# Patient Record
Sex: FEMALE | Race: WHITE | Employment: FULL TIME | URBAN - METROPOLITAN AREA
[De-identification: names, ages, dates, MRNs, and addresses within clinical notes are randomized per-mention and may not be internally consistent; named-entity substitution may affect disease eponyms.]

---

## 2024-10-17 ENCOUNTER — APPOINTMENT (OUTPATIENT)
Dept: OBSTETRICS AND GYNECOLOGY | Facility: CLINIC | Age: 39
End: 2024-10-17
Payer: COMMERCIAL

## 2024-10-17 VITALS
SYSTOLIC BLOOD PRESSURE: 118 MMHG | WEIGHT: 150 LBS | DIASTOLIC BLOOD PRESSURE: 72 MMHG | BODY MASS INDEX: 22.73 KG/M2 | HEIGHT: 68 IN

## 2024-10-17 DIAGNOSIS — R10.2 PELVIC PAIN IN FEMALE: Primary | ICD-10-CM

## 2024-10-17 DIAGNOSIS — Z87.59 HISTORY OF ECTOPIC PREGNANCY: ICD-10-CM

## 2024-10-17 PROCEDURE — 99203 OFFICE O/P NEW LOW 30 MIN: CPT | Performed by: OBSTETRICS & GYNECOLOGY

## 2024-10-17 PROCEDURE — 3008F BODY MASS INDEX DOCD: CPT | Performed by: OBSTETRICS & GYNECOLOGY

## 2024-10-17 PROCEDURE — 1036F TOBACCO NON-USER: CPT | Performed by: OBSTETRICS & GYNECOLOGY

## 2024-10-17 ASSESSMENT — PAIN SCALES - GENERAL: PAINLEVEL_OUTOF10: 0-NO PAIN

## 2024-10-17 NOTE — PROGRESS NOTES
"Assessment     PLAN  1. Pelvic pain in female (Primary)  - Recommend pelvic ultrasound to assess for structural abnormalities. Suspect musculoskeletal pain based on description. Recommend trial of pelvic floor PT and she is agreeable.   - US PELVIS TRANSABDOMINAL WITH TRANSVAGINAL; Future  - Referral to Physical Therapy; Future    Please return for your next preventative visit or sooner NATALIE Galaviz MD      Subjective     Rosalina Castillo is a 39 y.o. female who is here for a problem visit  PCP = No primary care provider on file.    Concerns: persistent pelvic  and umbilical pain after ectopic surgery in 2024  - trying to conceive since   - lap left salpingectomy at [facility] in 2024  - recovered well initially but then feels like she is having persistent discomfort at her umbilicus.   - lower abdomen hurts with BM  - has had painful sex since surgery  - worsening pain with menses since surgery  - decreased sensation with voiding, frequent urination    Gynecologic History:    OBHx:   Menses: Regular last few months,  veyr painful   Last Pap: reports up to date  Sexually active: active with   Contraception: None      PMH, PSH, FH, SH, medications and allergies reviewed and edited as needed.      Objective   /72   Ht 1.727 m (5' 8\")   Wt 68 kg (150 lb)   LMP 2024   BMI 22.81 kg/m²      General:   Alert and oriented, in no acute distress           Abdomen: Soft, non-tender, without masses or organomegaly   Vulva: Normal architecture without erythema, masses, or lesions.    Vagina: Normal mucosa without lesions, masses, or atrophy. No abnormal vaginal discharge.    Cervix: Normal without masses, lesions, or signs of cervicitis.    Uterus: Normal mobile, non-enlarged uterus    Adnexa: Normal without masses or lesions   Pelvic Floor No POP noted. No high tone pelvic floor   Psych Normal affect. Normal mood.        " Yes

## 2024-10-19 ENCOUNTER — HOSPITAL ENCOUNTER (OUTPATIENT)
Dept: RADIOLOGY | Facility: CLINIC | Age: 39
Discharge: HOME | End: 2024-10-19
Payer: COMMERCIAL

## 2024-10-19 DIAGNOSIS — R10.2 PELVIC PAIN IN FEMALE: ICD-10-CM

## 2024-10-19 PROCEDURE — 76856 US EXAM PELVIC COMPLETE: CPT

## 2024-10-19 PROCEDURE — 76856 US EXAM PELVIC COMPLETE: CPT | Performed by: STUDENT IN AN ORGANIZED HEALTH CARE EDUCATION/TRAINING PROGRAM

## 2024-10-19 PROCEDURE — 76830 TRANSVAGINAL US NON-OB: CPT | Performed by: STUDENT IN AN ORGANIZED HEALTH CARE EDUCATION/TRAINING PROGRAM

## 2024-11-15 ENCOUNTER — APPOINTMENT (OUTPATIENT)
Dept: OBSTETRICS AND GYNECOLOGY | Facility: CLINIC | Age: 39
End: 2024-11-15
Payer: COMMERCIAL

## 2024-11-15 ENCOUNTER — APPOINTMENT (OUTPATIENT)
Dept: PHYSICAL THERAPY | Facility: CLINIC | Age: 39
End: 2024-11-15
Payer: COMMERCIAL

## 2024-11-21 ENCOUNTER — APPOINTMENT (OUTPATIENT)
Dept: PHYSICAL THERAPY | Facility: CLINIC | Age: 39
End: 2024-11-21
Payer: COMMERCIAL

## 2024-11-29 ENCOUNTER — APPOINTMENT (OUTPATIENT)
Dept: PHYSICAL THERAPY | Facility: CLINIC | Age: 39
End: 2024-11-29
Payer: COMMERCIAL

## 2025-01-22 ENCOUNTER — APPOINTMENT (OUTPATIENT)
Dept: PRIMARY CARE | Facility: CLINIC | Age: 40
End: 2025-01-22
Payer: COMMERCIAL

## 2025-01-22 ENCOUNTER — LAB (OUTPATIENT)
Dept: LAB | Facility: LAB | Age: 40
End: 2025-01-22
Payer: COMMERCIAL

## 2025-01-22 VITALS
HEART RATE: 76 BPM | SYSTOLIC BLOOD PRESSURE: 127 MMHG | HEIGHT: 68 IN | TEMPERATURE: 98.6 F | BODY MASS INDEX: 23.49 KG/M2 | RESPIRATION RATE: 18 BRPM | WEIGHT: 155 LBS | DIASTOLIC BLOOD PRESSURE: 83 MMHG

## 2025-01-22 DIAGNOSIS — R53.83 FATIGUE, UNSPECIFIED TYPE: ICD-10-CM

## 2025-01-22 DIAGNOSIS — Z13.9 SCREENING DUE: ICD-10-CM

## 2025-01-22 DIAGNOSIS — Z31.9 INFERTILITY MANAGEMENT: ICD-10-CM

## 2025-01-22 DIAGNOSIS — Z00.00 LABORATORY EXAM ORDERED AS PART OF ROUTINE GENERAL MEDICAL EXAMINATION: ICD-10-CM

## 2025-01-22 DIAGNOSIS — Z87.11 HISTORY OF STOMACH ULCERS: ICD-10-CM

## 2025-01-22 DIAGNOSIS — Z00.00 ROUTINE GENERAL MEDICAL EXAMINATION AT A HEALTH CARE FACILITY: Primary | ICD-10-CM

## 2025-01-22 DIAGNOSIS — O00.102 LEFT TUBAL PREGNANCY WITHOUT INTRAUTERINE PREGNANCY (HHS-HCC): ICD-10-CM

## 2025-01-22 LAB
ERYTHROCYTE [DISTWIDTH] IN BLOOD BY AUTOMATED COUNT: 12.6 % (ref 11.5–14.5)
ERYTHROCYTE [SEDIMENTATION RATE] IN BLOOD BY WESTERGREN METHOD: 11 MM/H (ref 0–20)
EST. AVERAGE GLUCOSE BLD GHB EST-MCNC: 85 MG/DL
HBA1C MFR BLD: 4.6 %
HCT VFR BLD AUTO: 42.8 % (ref 36–46)
HGB BLD-MCNC: 14.6 G/DL (ref 12–16)
HIV 1+2 AB+HIV1 P24 AG SERPL QL IA: NONREACTIVE
MCH RBC QN AUTO: 31.9 PG (ref 26–34)
MCHC RBC AUTO-ENTMCNC: 34.1 G/DL (ref 32–36)
MCV RBC AUTO: 93 FL (ref 80–100)
NRBC BLD-RTO: 0 /100 WBCS (ref 0–0)
PLATELET # BLD AUTO: 334 X10*3/UL (ref 150–450)
RBC # BLD AUTO: 4.58 X10*6/UL (ref 4–5.2)
WBC # BLD AUTO: 5.5 X10*3/UL (ref 4.4–11.3)

## 2025-01-22 PROCEDURE — 86038 ANTINUCLEAR ANTIBODIES: CPT

## 2025-01-22 PROCEDURE — 82607 VITAMIN B-12: CPT

## 2025-01-22 PROCEDURE — 83036 HEMOGLOBIN GLYCOSYLATED A1C: CPT

## 2025-01-22 PROCEDURE — 86803 HEPATITIS C AB TEST: CPT

## 2025-01-22 PROCEDURE — 86146 BETA-2 GLYCOPROTEIN ANTIBODY: CPT

## 2025-01-22 PROCEDURE — 3008F BODY MASS INDEX DOCD: CPT | Performed by: NURSE PRACTITIONER

## 2025-01-22 PROCEDURE — 85027 COMPLETE CBC AUTOMATED: CPT

## 2025-01-22 PROCEDURE — 86140 C-REACTIVE PROTEIN: CPT

## 2025-01-22 PROCEDURE — 85730 THROMBOPLASTIN TIME PARTIAL: CPT

## 2025-01-22 PROCEDURE — 85652 RBC SED RATE AUTOMATED: CPT

## 2025-01-22 PROCEDURE — 84443 ASSAY THYROID STIM HORMONE: CPT

## 2025-01-22 PROCEDURE — 99385 PREV VISIT NEW AGE 18-39: CPT | Performed by: NURSE PRACTITIONER

## 2025-01-22 PROCEDURE — 1036F TOBACCO NON-USER: CPT | Performed by: NURSE PRACTITIONER

## 2025-01-22 PROCEDURE — 80053 COMPREHEN METABOLIC PANEL: CPT

## 2025-01-22 PROCEDURE — 82306 VITAMIN D 25 HYDROXY: CPT

## 2025-01-22 PROCEDURE — 86147 CARDIOLIPIN ANTIBODY EA IG: CPT

## 2025-01-22 PROCEDURE — 86376 MICROSOMAL ANTIBODY EACH: CPT

## 2025-01-22 PROCEDURE — 85613 RUSSELL VIPER VENOM DILUTED: CPT

## 2025-01-22 PROCEDURE — 80061 LIPID PANEL: CPT

## 2025-01-22 PROCEDURE — 87389 HIV-1 AG W/HIV-1&-2 AB AG IA: CPT

## 2025-01-22 ASSESSMENT — ENCOUNTER SYMPTOMS
PALPITATIONS: 0
EYES NEGATIVE: 1
RESPIRATORY NEGATIVE: 1
HEADACHES: 1
PSYCHIATRIC NEGATIVE: 1
HEMATOLOGIC/LYMPHATIC NEGATIVE: 1
ALLERGIC/IMMUNOLOGIC NEGATIVE: 1
FATIGUE: 1
MUSCULOSKELETAL NEGATIVE: 1

## 2025-01-22 ASSESSMENT — ANXIETY QUESTIONNAIRES
6. BECOMING EASILY ANNOYED OR IRRITABLE: NOT AT ALL
GAD7 TOTAL SCORE: 0
7. FEELING AFRAID AS IF SOMETHING AWFUL MIGHT HAPPEN: NOT AT ALL
5. BEING SO RESTLESS THAT IT IS HARD TO SIT STILL: NOT AT ALL
IF YOU CHECKED OFF ANY PROBLEMS ON THIS QUESTIONNAIRE, HOW DIFFICULT HAVE THESE PROBLEMS MADE IT FOR YOU TO DO YOUR WORK, TAKE CARE OF THINGS AT HOME, OR GET ALONG WITH OTHER PEOPLE: NOT DIFFICULT AT ALL
3. WORRYING TOO MUCH ABOUT DIFFERENT THINGS: NOT AT ALL
4. TROUBLE RELAXING: NOT AT ALL
1. FEELING NERVOUS, ANXIOUS, OR ON EDGE: NOT AT ALL
2. NOT BEING ABLE TO STOP OR CONTROL WORRYING: NOT AT ALL

## 2025-01-22 ASSESSMENT — PATIENT HEALTH QUESTIONNAIRE - PHQ9
10. IF YOU CHECKED OFF ANY PROBLEMS, HOW DIFFICULT HAVE THESE PROBLEMS MADE IT FOR YOU TO DO YOUR WORK, TAKE CARE OF THINGS AT HOME, OR GET ALONG WITH OTHER PEOPLE: SOMEWHAT DIFFICULT
2. FEELING DOWN, DEPRESSED OR HOPELESS: SEVERAL DAYS
SUM OF ALL RESPONSES TO PHQ9 QUESTIONS 1 AND 2: 1
1. LITTLE INTEREST OR PLEASURE IN DOING THINGS: NOT AT ALL

## 2025-01-22 NOTE — PROGRESS NOTES
"Subjective   Patient ID: Rosalina Castillo is a 39 y.o. female who presents for Establish Care.    She is very active and working out is difficult.  Heart rate increases pretty easily.  Prior to it has been fine not feeling back to normal.  Eats healthy. In the winter has facial flushing.   In her family she found out her parents had trouble conceiving but had two natural pregnancies ultimately. Her dad has had some blood clots- she has never had blood clots.        Patient had ruptured ectopic pregnancy 06/2024.   She has followed with Dr. Lyons for GYN.     Review of Systems   Constitutional:  Positive for fatigue.   HENT: Negative.     Eyes: Negative.    Respiratory: Negative.     Cardiovascular:  Negative for chest pain, palpitations and leg swelling.   Endocrine: Positive for polyuria (every since surgery for ectopic).   Genitourinary: Negative.    Musculoskeletal: Negative.    Skin: Negative.    Allergic/Immunologic: Negative.    Neurological:  Positive for headaches (more often than not take tylenol).   Hematological: Negative.    Psychiatric/Behavioral: Negative.         Objective   /83   Pulse 76   Temp 37 °C (98.6 °F)   Resp 18   Ht 1.727 m (5' 8\")   Wt 70.3 kg (155 lb)   LMP 01/19/2025 (Approximate)   BMI 23.57 kg/m²     Physical Exam  Vitals and nursing note reviewed.   Constitutional:       Appearance: Normal appearance.   HENT:      Head: Normocephalic and atraumatic.      Nose: Nose normal.      Mouth/Throat:      Mouth: Mucous membranes are moist.   Eyes:      Pupils: Pupils are equal, round, and reactive to light.   Cardiovascular:      Rate and Rhythm: Normal rate and regular rhythm.      Pulses: Normal pulses.      Heart sounds: Normal heart sounds. No murmur heard.     No gallop.      Comments: Reddish discoloration of fingers decreased cap refill     Pulmonary:      Effort: Pulmonary effort is normal. No respiratory distress.      Breath sounds: Normal breath sounds. No stridor. No " wheezing, rhonchi or rales.   Chest:      Chest wall: No tenderness.   Abdominal:      General: Abdomen is flat.      Palpations: Abdomen is soft.   Musculoskeletal:         General: Normal range of motion.      Cervical back: Normal range of motion.   Skin:     General: Skin is warm.      Capillary Refill: Capillary refill takes 2 to 3 seconds.      Findings: Rash is not crusting, macular, nodular, papular, purpuric, pustular, scaling, urticarial or vesicular.      Nails: There is no clubbing.   Neurological:      General: No focal deficit present.      Mental Status: She is alert and oriented to person, place, and time.      Cranial Nerves: Cranial nerves 2-12 are intact.      Sensory: Sensation is intact.      Motor: Motor function is intact.      Deep Tendon Reflexes: Reflexes are normal and symmetric.   Psychiatric:         Mood and Affect: Mood normal.         Behavior: Behavior normal.         Thought Content: Thought content normal.         Judgment: Judgment normal.         Assessment/Plan   Problem List Items Addressed This Visit             ICD-10-CM    Left tubal pregnancy without intrauterine pregnancy ([facility]) O00.102    Relevant Orders    Referral to Reproductive Endocrinology    Infertility management Z31.9     Ref to infertility for poss IUI consideration.            Relevant Orders    Lupus Anticoagulant with Interpretation (DRVVT + SCT)    Anti-Cardiolipin Antibody (IgA, IgG, and IgM)    Beta-2 Glycoprotein Antibodies (IgA, IgG and IgM)    Referral to Reproductive Endocrinology    History of stomach ulcers Z87.11     Secondary to NSAID use   Avoid NSAIDS and if needed prophylaxis with PPI           Other Visit Diagnoses         Codes    Routine general medical examination at a health care facility    -  Primary Z00.00    Laboratory exam ordered as part of routine general medical examination     Z00.00    Relevant Orders    Hemoglobin A1C    CBC    Lipid Panel    Vitamin D 25-Hydroxy,Total (for  eval of Vitamin D levels)    Vitamin B12    Comprehensive Metabolic Panel    Fatigue, unspecified type     R53.83    Relevant Orders    ALISHA with Reflex to JUDI    C-Reactive Protein    Sedimentation Rate    Thyroid Peroxidase (TPO) Antibody    TSH with reflex to Free T4 if abnormal    Screening due     Z13.9    Relevant Orders    HIV 1/2 Antigen/Antibody Screen with Reflex to Confirmation    Hepatitis C antibody             Discussing IUI at this point.

## 2025-01-23 LAB
25(OH)D3 SERPL-MCNC: 27 NG/ML (ref 30–100)
ALBUMIN SERPL BCP-MCNC: 4.3 G/DL (ref 3.4–5)
ALP SERPL-CCNC: 44 U/L (ref 33–110)
ALT SERPL W P-5'-P-CCNC: 20 U/L (ref 7–45)
ANA SER QL HEP2 SUBST: NEGATIVE
ANION GAP SERPL CALC-SCNC: 13 MMOL/L (ref 10–20)
AST SERPL W P-5'-P-CCNC: 22 U/L (ref 9–39)
B2 GLYCOPROT1 IGA SER-ACNC: 0.7 U/ML
B2 GLYCOPROT1 IGG SER-ACNC: <1.4 U/ML
B2 GLYCOPROT1 IGM SER-ACNC: 3.9 U/ML
BILIRUB SERPL-MCNC: 0.5 MG/DL (ref 0–1.2)
BUN SERPL-MCNC: 13 MG/DL (ref 6–23)
CALCIUM SERPL-MCNC: 9.2 MG/DL (ref 8.6–10.6)
CARDIOLIPIN IGA SERPL-ACNC: 1.7 APL U/ML
CARDIOLIPIN IGG SER IA-ACNC: <1.6 GPL U/ML
CARDIOLIPIN IGM SER IA-ACNC: 6.4 MPL U/ML
CHLORIDE SERPL-SCNC: 102 MMOL/L (ref 98–107)
CHOLEST SERPL-MCNC: 210 MG/DL (ref 0–199)
CHOLESTEROL/HDL RATIO: 3
CO2 SERPL-SCNC: 27 MMOL/L (ref 21–32)
CREAT SERPL-MCNC: 0.73 MG/DL (ref 0.5–1.05)
CRP SERPL-MCNC: 0.29 MG/DL
DRVVT SCREEN TO CONFIRM RATIO: 0.79 RATIO
DRVVT/DRVVT CFM NRMLZD PPP-RTO: 0.9 RATIO
DRVVT/DRVVT CFM P DOAC NEUT NORM PPP-RTO: 0.87 RATIO
EGFRCR SERPLBLD CKD-EPI 2021: >90 ML/MIN/1.73M*2
GLUCOSE SERPL-MCNC: 99 MG/DL (ref 74–99)
HCV AB SER QL: NONREACTIVE
HDLC SERPL-MCNC: 69.3 MG/DL
LA 2 SCREEN W REFLEX-IMP: NORMAL
LDLC SERPL CALC-MCNC: 118 MG/DL
NON HDL CHOLESTEROL: 141 MG/DL (ref 0–149)
NORMALIZED SCT PPP-RTO: 0.8 RATIO
POTASSIUM SERPL-SCNC: 4.1 MMOL/L (ref 3.5–5.3)
PROT SERPL-MCNC: 7.2 G/DL (ref 6.4–8.2)
SILICA CLOTTING TIME CONFIRMATION: 0.89 RATIO
SILICA CLOTTING TIME SCREEN: 0.72 RATIO
SODIUM SERPL-SCNC: 138 MMOL/L (ref 136–145)
THYROPEROXIDASE AB SERPL-ACNC: <28 IU/ML
TRIGL SERPL-MCNC: 115 MG/DL (ref 0–149)
TSH SERPL-ACNC: 1.51 MIU/L (ref 0.44–3.98)
VIT B12 SERPL-MCNC: 367 PG/ML (ref 211–911)
VLDL: 23 MG/DL (ref 0–40)

## 2025-01-28 ASSESSMENT — LIFESTYLE VARIABLES
TOBACCO_USE: NO
HISTORY_ALCOHOL_USE: YES

## 2025-01-29 ENCOUNTER — CONSULT (OUTPATIENT)
Dept: ENDOCRINOLOGY | Facility: CLINIC | Age: 40
End: 2025-01-29
Payer: COMMERCIAL

## 2025-01-29 VITALS
SYSTOLIC BLOOD PRESSURE: 132 MMHG | HEIGHT: 68 IN | HEART RATE: 69 BPM | DIASTOLIC BLOOD PRESSURE: 87 MMHG | WEIGHT: 155 LBS | BODY MASS INDEX: 23.49 KG/M2

## 2025-01-29 DIAGNOSIS — N91.5 OLIGOMENORRHEA, UNSPECIFIED TYPE: ICD-10-CM

## 2025-01-29 DIAGNOSIS — Z31.41 FERTILITY TESTING: ICD-10-CM

## 2025-01-29 DIAGNOSIS — Z01.812 ENCOUNTER FOR PREPROCEDURAL LABORATORY EXAMINATION: ICD-10-CM

## 2025-01-29 DIAGNOSIS — Z11.59 ENCOUNTER FOR SCREENING FOR OTHER VIRAL DISEASES: ICD-10-CM

## 2025-01-29 DIAGNOSIS — N70.11 CHRONIC SALPINGITIS: Primary | ICD-10-CM

## 2025-01-29 DIAGNOSIS — Z13.71 SCREENING FOR GENETIC DISEASE CARRIER STATUS: ICD-10-CM

## 2025-01-29 DIAGNOSIS — O00.102 LEFT TUBAL PREGNANCY WITHOUT INTRAUTERINE PREGNANCY (HHS-HCC): ICD-10-CM

## 2025-01-29 DIAGNOSIS — Z01.83 ENCOUNTER FOR RH BLOOD TYPING: ICD-10-CM

## 2025-01-29 DIAGNOSIS — Z11.3 SCREENING FOR STDS (SEXUALLY TRANSMITTED DISEASES): ICD-10-CM

## 2025-01-29 DIAGNOSIS — Z31.9 INFERTILITY MANAGEMENT: ICD-10-CM

## 2025-01-29 PROCEDURE — 99215 OFFICE O/P EST HI 40 MIN: CPT | Performed by: OBSTETRICS & GYNECOLOGY

## 2025-01-29 ASSESSMENT — PAIN SCALES - GENERAL: PAINLEVEL_OUTOF10: 3

## 2025-01-29 NOTE — PROGRESS NOTES
Visit Type: In Person  MD reviewed, Authorization obtained to share with partner.    NEW FERTILITY PATIENT VISIT    Referred by: My PCP    Accompanied today by: Jonas Castillo       Rosalina Castillo is a 39 y.o.  female who presents with primary infertility and desires to proceed with testing.  She and her partner have been attempting to become pregnant over the course of the last 3 and half years without success.  He had previously been seen by Dr. Dimas at [facility] and had undergone initial workup in summer 2023.  Following that they had a spontaneous pregnancy that occurred in the spring 2024 that resulted in a left-sided ruptured ectopic pregnancy.  This was treated by laparoscopic left salpingectomy in 2024 at [facility].  Since that time the patient does report having increased pelvic pain that occurs with menses.  Menses are also heavier and last longer than they previously had, now lasting 6 to 8 days rather 3 to 4 days.    She recently had blood testing performed with her PCP as seen below and APLS labs were obtained as her father does have a history of blood clots.  The patient has no personal history of thromboembolism.    Have you had any concerns about your fertility treatments so far?     What are you goals for today's visit? To determine a realistic plan to get pregnant, why I have not been able to conceive, pain relief     What causes of infertility have been identified on your workup so far? Unexplained.     Past Infertility Treatments: No      Please summarize your fertility treatments to date.       As far as you are aware, do you have insurance coverage for fertility diagnostic testing and/or fertility treatments? No      PRIOR EVALUATION / TREATMENT    GYN Pelvic Ultrasound: US PELVIS (7/3/2024):   1. No acute pelvic pathology. Small volume pelvic free fluid likely physiologic.  2. Right corpus luteal cyst measuring 1.7 cm.        Prior Labs  Component      Latest Ref Rng 2025    DRVVT Screen      RATIO 0.79    DRVVT Confirmation      RATIO 0.90    DRVVT Test Ratio      <=1.20 RATIO 0.87    SCT Screen      RATIO 0.72    SCT Confirmation      RATIO 0.89    SCT Test Ratio      <=1.16 RATIO 0.80    Lupus Anticoagulant Interpretation No evidence of lupus anticoagulant in these assays (DRVVT and Silica Clotting Time (SCT)). Assay interferences may occur in the presence of factor deficiency/inhibitor and/or anticoagulants. For patients on anti-Vitamin K therapy, repeating DRVVT testing might be indicated when the patient is off anti-vitamin K therapy. The DRVVT assay contains a heparin neutralizer up to 1.0 U/mL. Higher concentrations of heparin may cause interferences. SCT results are not affected by UF heparin up to 0.5 U/mL and LMW Heparin up to 1.0 U/mL. Higher concentrations of heparin may cause interferences. Correlation with clinical findings and clinical history is necessary to assess significance of results in an individual patient.    Anticardiolipin IgA      <20.0 APL U/mL 1.7    Anticardiolipin IgG      <20.0 GPL U/mL <1.6    Anticardiolipin IgM      <20.0 MPL U/mL 6.4    Beta-2 Glyco 1 IgG      <20.0 U/mL <1.4    Beta-2 Glyco 1 IgA      <20.0 U/mL 0.7    Beta 2 Glyco 1 IgM      <20.0 U/mL 3.9    Hemoglobin A1C      See comment % 4.6    Estimated Average Glucose      Not Established mg/dL 85    ALISHA      Negative  Negative    C-Reactive Protein      <1.00 mg/dL 0.29    Sed Rate      0 - 20 mm/h 11    Thyroid Peroxidase (TPO) Antibody      <=60 IU/mL <28    Thyroid Stimulating Hormone      0.44 - 3.98 mIU/L 1.51    Vitamin D, 25-Hydroxy, Total      30 - 100 ng/mL 27 (L)    Vitamin B12      211 - 911 pg/mL 367    HIV 1/2 Antigen/Antibody Screen with Reflex to Confirmation      Nonreactive  Nonreactive    Hepatitis C AB      Nonreactive  Nonreactive         Relationship Status:      Have you ever been pregnant? Yes    How many times have you been pregnant? 1    Have you ever had a  "miscarriage? No    How many times have you had a miscarriage?       OB Hx     OB History          1    Para        Term                AB   1    Living             SAB        IAB        Ectopic   1    Multiple        Live Births                     GYN HISTORY   Have you ever been diagnosed with a sexually transmitted disease? Yes    Please select all that are applicable: herpes    Have you ever had Pelvic Inflammatory Disease? No    Have you had an abnormal PAP smear? Yes    Date & Result of last PAP smear: No results found for: \"FINALINTERP\"   Have you ever had an abnormal Mammogram? No    Date & result of your last mammogram:    Do you have pelvic pain? Yes    How many times per week do you have intercourse? 2-3    Do you have pain with intercourse? Yes    Do you use lubricants with intercourse?    Do you have pain with bowel movements? Yes              Do you have pain with a full bladder? Yes    MENSTRUAL HISTORY  LMP:    Menarche:    Contraception:    Cycle length: 29    Describe your bleeding: Heavy    Dysmenorrhea: Yes       ENDOCRINE/INFERTILITY HISTORY  Duration of infertility: 1-5 years    Coital Activity/week: 2-3    Nipple Discharge: No    Vision changes: Yes    Headaches: Yes    Excess hair growth: No    Excessive hair loss: No    Acne: No    Oily skin: No    Recent weight change  Weight gain: Yes    Weight loss: No    Exercise more than 3 times a week: No      PMH  Past Medical History:   Diagnosis Date    Abnormal Pap smear of cervix     Herpes         MEDICATIONS  Current Outpatient Medications on File Prior to Visit   Medication Sig Dispense Refill    PRENATAL 2-IRON-FOLIC ACID-OM3 ORAL Take by mouth.       No current facility-administered medications on file prior to visit.        PSH  Past Surgical History:   Procedure Laterality Date    ECTOPIC PREGNANCY SURGERY Left         PSYCH HISTORY  Have you ever been diagnosed with a mental health Issue? No    Have you ever been " "hospitalized for a mental health disorder? No       SOCIAL HISTORY  Social History     Tobacco Use    Smoking status: Never     Passive exposure: Never    Smokeless tobacco: Never   Vaping Use    Vaping status: Never Used   Substance Use Topics    Alcohol use: Yes     Alcohol/week: 5.0 standard drinks of alcohol     Types: 3 Glasses of wine, 2 Standard drinks or equivalent per week    Drug use: Never     Occupation: Teacher    Have you ever been incarcerated? No    Do you have a history of domestic violence? No    Do you feel safe at home? Yes    Do you have a history of any negative sexual experience such as incest or rape? No       PARTNER HISTORY  Partner Name: Jonas Castillo    Partner : [id number]    Partner email:    Occupation: Teacher    Prior fertility history:    PMH:    PSH:    Smoking:No    Alcohol Use: Yes    Drug Use: No    Medications:    Injuries: No    STD: Yes    Please select all that are applicable: herpes    SA: Yes    SA Results: Yes      FAMILY HISTORY  Family History   Problem Relation Name Age of Onset    Other (overweight) Father Michaal     Lung cancer Maternal Grandmother      Other (old age) Maternal Grandfather         CANCER HISTORY    Breast:    Ovarian:    Colon:    Endometrial:      FAMILY VTE HISTORY  Family History of Blood Clots:      GENETIC HISTORY  Ethnic Background  Patient:     Partner:     Genetic Disease in Family  Patient: No    Partner: No    Birth Defects in Family  Patient: No    Partner: No    Genetic screening performed previously:       BMI:   BMI Readings from Last 1 Encounters:   25 23.57 kg/m²     VITALS:  /87 (BP Location: Right arm, Patient Position: Sitting, BP Cuff Size: Adult)   Pulse 69   Ht 1.727 m (5' 8\")   Wt 70.3 kg (155 lb)   LMP 2025 (Exact Date)   BMI 23.57 kg/m²     ASSESSMENT   Rosalina is a 39 y.o.  female with primary infertility, pelvic pain, dysmenorrhea, status post left salpingectomy for " ruptured ectopic pregnancy in June 2024.  She desires to proceed with fertility testing.    Partner SA:  Ordered    COUNSELING  We discussed causes of infertility including hormonal, egg quality issues, structural problems such as endometriosis, adhesions, or tubal problems, uterine factors such as polyps or fibroids, and sperm issues. Reviewed evaluation of such as well. We discussed various methods for achieving pregnancy in some detail including, ovulation induction, insemination, superovulation and IVF.    Routine Testing  Fertility Center  STDs Within 1 year   Genetic carrier Waiver/Completed   T&S Within 1 year   AMH Within 1 year   TSH Within 1 year   Rubella/Varicella Within 5 years     PLAN  Orders Placed This Encounter   Procedures    Hysterosalpingogram (HSG)    FL hysterosalpingogram    Antimullerian Hormone (Amh)    TSH with reflex to Free T4 if abnormal    Prolactin    Type And Screen Is this order related to pregnancy or an upcoming surgery? No    Rubella Antibody, Igg    Varicella Zoster Antibody, Igg    Hepatitis B surface antigen    Syphilis Screen with Reflex    C. trachomatis / N. gonorrhoeae, Amplified, Urogenital    Myriad Foresight Carrier Screen    POCT pregnancy, urine manually resulted       GENETIC SCREENING PATIENT  Ordered    PARTNER  Yes Semen Analysis: Ordered  Yes Genetic screening: Ordered    FOLLOW UP   Consults: Financial consult  Chart to primary nurse for care coordination and patient check list/education  Enroll in Engaged MD  Take prenatal vitamins, vitamin D 2000 IUs daily  Discussed that pap and mammogram must be updated per ACOG guidelines before treatment can begin  Discussed that treatment cannot proceed until checklist items are complete   6 week follow up with MD  Additional testing for BMI < 18 or > 40: No  Sperm Donor:      MD Completion:  Ectopic Risk: Yes  Medically Complex: No    Fertility Plan Update: Complete testing, follow-up to discuss options and next  steps.    Intimate Exam Performed: No, an intimate exam was not performed at this encounter.     Jimbo Johnston  01/29/2025  8:41 AM

## 2025-01-29 NOTE — LETTER
2025     Chely Waldron, APRN-CNP  [address]  [address]  [facility] [address]    Patient: Rosalina Castillo   YOB: 1985   Date of Visit: 2025       Dear Dr. Chely Waldron, BIRGIT-CNP:    Thank you for referring Rosalina Castillo to me for evaluation. Below are my notes for this consultation.  If you have questions, please do not hesitate to call me. I look forward to following your patient along with you.       Sincerely,     Jimbo Johnston MD      CC: No Recipients  ______________________________________________________________________________________      Visit Type: In Person  MD reviewed, Authorization obtained to share with partner.    NEW FERTILITY PATIENT VISIT    Referred by: My PCP    Accompanied today by: Jonas Castillo       Rosalina Castillo is a 39 y.o.  female who presents with primary infertility and desires to proceed with testing.  She and her partner have been attempting to become pregnant over the course of the last 3 and half years without success.  He had previously been seen by Dr. Dimas at [facility] and had undergone initial workup in summer 2023.  Following that they had a spontaneous pregnancy that occurred in the spring 2024 that resulted in a left-sided ruptured ectopic pregnancy.  This was treated by laparoscopic left salpingectomy in 2024 at [facility].  Since that time the patient does report having increased pelvic pain that occurs with menses.  Menses are also heavier and last longer than they previously had, now lasting 6 to 8 days rather 3 to 4 days.    She recently had blood testing performed with her PCP as seen below and APLS labs were obtained as her father does have a history of blood clots.  The patient has no personal history of thromboembolism.    Have you had any concerns about your fertility treatments so far?     What are you goals for today's visit? To determine a realistic plan to get pregnant, why I have not been able to conceive,  pain relief     What causes of infertility have been identified on your workup so far? Unexplained.     Past Infertility Treatments: No      Please summarize your fertility treatments to date.       As far as you are aware, do you have insurance coverage for fertility diagnostic testing and/or fertility treatments? No      PRIOR EVALUATION / TREATMENT    GYN Pelvic Ultrasound: US PELVIS (7/3/2024):   1. No acute pelvic pathology. Small volume pelvic free fluid likely physiologic.  2. Right corpus luteal cyst measuring 1.7 cm.        Prior Labs  Component      Latest Ref Rng 1/22/2025   DRVVT Screen      RATIO 0.79    DRVVT Confirmation      RATIO 0.90    DRVVT Test Ratio      <=1.20 RATIO 0.87    SCT Screen      RATIO 0.72    SCT Confirmation      RATIO 0.89    SCT Test Ratio      <=1.16 RATIO 0.80    Lupus Anticoagulant Interpretation No evidence of lupus anticoagulant in these assays (DRVVT and Silica Clotting Time (SCT)). Assay interferences may occur in the presence of factor deficiency/inhibitor and/or anticoagulants. For patients on anti-Vitamin K therapy, repeating DRVVT testing might be indicated when the patient is off anti-vitamin K therapy. The DRVVT assay contains a heparin neutralizer up to 1.0 U/mL. Higher concentrations of heparin may cause interferences. SCT results are not affected by UF heparin up to 0.5 U/mL and LMW Heparin up to 1.0 U/mL. Higher concentrations of heparin may cause interferences. Correlation with clinical findings and clinical history is necessary to assess significance of results in an individual patient.    Anticardiolipin IgA      <20.0 APL U/mL 1.7    Anticardiolipin IgG      <20.0 GPL U/mL <1.6    Anticardiolipin IgM      <20.0 MPL U/mL 6.4    Beta-2 Glyco 1 IgG      <20.0 U/mL <1.4    Beta-2 Glyco 1 IgA      <20.0 U/mL 0.7    Beta 2 Glyco 1 IgM      <20.0 U/mL 3.9    Hemoglobin A1C      See comment % 4.6    Estimated Average Glucose      Not Established mg/dL 85    ALISHA       "Negative  Negative    C-Reactive Protein      <1.00 mg/dL 0.29    Sed Rate      0 - 20 mm/h 11    Thyroid Peroxidase (TPO) Antibody      <=60 IU/mL <28    Thyroid Stimulating Hormone      0.44 - 3.98 mIU/L 1.51    Vitamin D, 25-Hydroxy, Total      30 - 100 ng/mL 27 (L)    Vitamin B12      211 - 911 pg/mL 367    HIV 1/2 Antigen/Antibody Screen with Reflex to Confirmation      Nonreactive  Nonreactive    Hepatitis C AB      Nonreactive  Nonreactive         Relationship Status:      Have you ever been pregnant? Yes    How many times have you been pregnant? 1    Have you ever had a miscarriage? No    How many times have you had a miscarriage?       OB Hx     OB History          1    Para        Term                AB   1    Living             SAB        IAB        Ectopic   1    Multiple        Live Births                     GYN HISTORY   Have you ever been diagnosed with a sexually transmitted disease? Yes    Please select all that are applicable: herpes    Have you ever had Pelvic Inflammatory Disease? No    Have you had an abnormal PAP smear? Yes    Date & Result of last PAP smear: No results found for: \"FINALINTERP\"   Have you ever had an abnormal Mammogram? No    Date & result of your last mammogram:    Do you have pelvic pain? Yes    How many times per week do you have intercourse? 2-3    Do you have pain with intercourse? Yes    Do you use lubricants with intercourse?    Do you have pain with bowel movements? Yes              Do you have pain with a full bladder? Yes    MENSTRUAL HISTORY  LMP:    Menarche:    Contraception:    Cycle length: 29    Describe your bleeding: Heavy    Dysmenorrhea: Yes       ENDOCRINE/INFERTILITY HISTORY  Duration of infertility: 1-5 years    Coital Activity/week: 2-3    Nipple Discharge: No    Vision changes: Yes    Headaches: Yes    Excess hair growth: No    Excessive hair loss: No    Acne: No    Oily skin: No    Recent weight change  Weight gain: Yes    Weight loss: " No    Exercise more than 3 times a week: No      PMH  Past Medical History:   Diagnosis Date   • Abnormal Pap smear of cervix    • Herpes         MEDICATIONS  Current Outpatient Medications on File Prior to Visit   Medication Sig Dispense Refill   • PRENATAL 2-IRON-FOLIC ACID-OM3 ORAL Take by mouth.       No current facility-administered medications on file prior to visit.        PSH  Past Surgical History:   Procedure Laterality Date   • ECTOPIC PREGNANCY SURGERY Left         PSYCH HISTORY  Have you ever been diagnosed with a mental health Issue? No    Have you ever been hospitalized for a mental health disorder? No       SOCIAL HISTORY  Social History     Tobacco Use   • Smoking status: Never     Passive exposure: Never   • Smokeless tobacco: Never   Vaping Use   • Vaping status: Never Used   Substance Use Topics   • Alcohol use: Yes     Alcohol/week: 5.0 standard drinks of alcohol     Types: 3 Glasses of wine, 2 Standard drinks or equivalent per week   • Drug use: Never     Occupation: Teacher    Have you ever been incarcerated? No    Do you have a history of domestic violence? No    Do you feel safe at home? Yes    Do you have a history of any negative sexual experience such as incest or rape? No       PARTNER HISTORY  Partner Name: Jonas Castillo    Partner : [id number]    Partner email:    Occupation: Teacher    Prior fertility history:    PMH:    PSH:    Smoking:No    Alcohol Use: Yes    Drug Use: No    Medications:    Injuries: No    STD: Yes    Please select all that are applicable: herpes    SA: Yes    SA Results: Yes      FAMILY HISTORY  Family History   Problem Relation Name Age of Onset   • Other (overweight) Father Janessa    • Lung cancer Maternal Grandmother     • Other (old age) Maternal Grandfather         CANCER HISTORY    Breast:    Ovarian:    Colon:    Endometrial:      FAMILY VTE HISTORY  Family History of Blood Clots:      GENETIC HISTORY  Ethnic Background  Patient:     Partner:  "    Genetic Disease in Family  Patient: No    Partner: No    Birth Defects in Family  Patient: No    Partner: No    Genetic screening performed previously:       BMI:   BMI Readings from Last 1 Encounters:   25 23.57 kg/m²     VITALS:  /87 (BP Location: Right arm, Patient Position: Sitting, BP Cuff Size: Adult)   Pulse 69   Ht 1.727 m (5' 8\")   Wt 70.3 kg (155 lb)   LMP 2025 (Exact Date)   BMI 23.57 kg/m²     ASSESSMENT   Rosalina is a 39 y.o.  female with primary infertility, pelvic pain, dysmenorrhea, status post left salpingectomy for ruptured ectopic pregnancy in 2024.  She desires to proceed with fertility testing.    Partner SA:  Ordered    COUNSELING  We discussed causes of infertility including hormonal, egg quality issues, structural problems such as endometriosis, adhesions, or tubal problems, uterine factors such as polyps or fibroids, and sperm issues. Reviewed evaluation of such as well. We discussed various methods for achieving pregnancy in some detail including, ovulation induction, insemination, superovulation and IVF.    Routine Testing  Fertility Center  STDs Within 1 year   Genetic carrier Waiver/Completed   T&S Within 1 year   AMH Within 1 year   TSH Within 1 year   Rubella/Varicella Within 5 years     PLAN  Orders Placed This Encounter   Procedures   • Hysterosalpingogram (HSG)   • FL hysterosalpingogram   • Antimullerian Hormone (Amh)   • TSH with reflex to Free T4 if abnormal   • Prolactin   • Type And Screen Is this order related to pregnancy or an upcoming surgery? No   • Rubella Antibody, Igg   • Varicella Zoster Antibody, Igg   • Hepatitis B surface antigen   • Syphilis Screen with Reflex   • C. trachomatis / N. gonorrhoeae, Amplified, Urogenital   • Myriad Foresight Carrier Screen   • POCT pregnancy, urine manually resulted       GENETIC SCREENING PATIENT  Ordered    PARTNER  Yes Semen Analysis: Ordered  Yes Genetic screening: " Ordered    FOLLOW UP   Consults: Financial consult  Chart to primary nurse for care coordination and patient check list/education  Enroll in Engaged MD  Take prenatal vitamins, vitamin D 2000 IUs daily  Discussed that pap and mammogram must be updated per ACOG guidelines before treatment can begin  Discussed that treatment cannot proceed until checklist items are complete   6 week follow up with MD  Additional testing for BMI < 18 or > 40: No  Sperm Donor:      MD Completion:  Ectopic Risk: Yes  Medically Complex: No    Fertility Plan Update: Complete testing, follow-up to discuss options and next steps.    Intimate Exam Performed: No, an intimate exam was not performed at this encounter.     Jimbo Johnston  01/29/2025  8:41 AM

## 2025-01-29 NOTE — Clinical Note
Patient seen for new visit today, see my note for details.  Can someone reach out to her to coordinate when they have an opportunity?  Thank you

## 2025-01-30 DIAGNOSIS — Z12.31 BREAST CANCER SCREENING BY MAMMOGRAM: Primary | ICD-10-CM

## 2025-02-06 ENCOUNTER — LAB (OUTPATIENT)
Dept: LAB | Facility: HOSPITAL | Age: 40
End: 2025-02-06
Payer: COMMERCIAL

## 2025-02-06 LAB
ABO GROUP (TYPE) IN BLOOD: NORMAL
ANTIBODY SCREEN: NORMAL
RH FACTOR (ANTIGEN D): NORMAL

## 2025-02-06 PROCEDURE — 86900 BLOOD TYPING SEROLOGIC ABO: CPT

## 2025-02-06 PROCEDURE — 86850 RBC ANTIBODY SCREEN: CPT

## 2025-02-06 PROCEDURE — 86901 BLOOD TYPING SEROLOGIC RH(D): CPT

## 2025-02-07 LAB
C TRACH RRNA SPEC QL NAA+PROBE: NOT DETECTED
HBV SURFACE AG SERPL QL IA: NORMAL
MIS SERPL-MCNC: NORMAL NG/ML
N GONORRHOEA RRNA SPEC QL NAA+PROBE: NOT DETECTED
PROLACTIN SERPL-MCNC: 14.4 NG/ML
QUEST GC CT AMPLIFIED (ALWAYS MESSAGE): NORMAL
RUBV IGG SERPL IA-ACNC: 1.7 INDEX
T PALLIDUM AB SER QL IA: NORMAL
TSH SERPL-ACNC: 2.25 MIU/L
VZV IGG SER IA-ACNC: 14.7 S/CO

## 2025-02-11 LAB
C TRACH RRNA SPEC QL NAA+PROBE: NOT DETECTED
HBV SURFACE AG SERPL QL IA: NORMAL
MIS SERPL-MCNC: 1.14 NG/ML (ref 0.18–5.68)
N GONORRHOEA RRNA SPEC QL NAA+PROBE: NOT DETECTED
PROLACTIN SERPL-MCNC: 14.4 NG/ML
QUEST GC CT AMPLIFIED (ALWAYS MESSAGE): NORMAL
RUBV IGG SERPL IA-ACNC: 1.7 INDEX
T PALLIDUM AB SER QL IA: NEGATIVE
TSH SERPL-ACNC: 2.25 MIU/L
VZV IGG SER IA-ACNC: 14.7 S/CO

## 2025-02-25 ENCOUNTER — HOSPITAL ENCOUNTER (OUTPATIENT)
Dept: RADIOLOGY | Facility: HOSPITAL | Age: 40
Discharge: HOME | End: 2025-02-25
Payer: COMMERCIAL

## 2025-02-25 ENCOUNTER — ANCILLARY PROCEDURE (OUTPATIENT)
Dept: ENDOCRINOLOGY | Facility: CLINIC | Age: 40
End: 2025-02-25
Payer: COMMERCIAL

## 2025-02-25 DIAGNOSIS — Z01.812 ENCOUNTER FOR PREPROCEDURAL LABORATORY EXAMINATION: ICD-10-CM

## 2025-02-25 DIAGNOSIS — N70.11 CHRONIC SALPINGITIS: ICD-10-CM

## 2025-02-25 LAB — PREGNANCY TEST URINE, POC: NEGATIVE

## 2025-02-25 PROCEDURE — 58340 CATHETER FOR HYSTEROGRAPHY: CPT

## 2025-02-25 PROCEDURE — 74740 X-RAY FEMALE GENITAL TRACT: CPT

## 2025-02-25 PROCEDURE — 2550000001 HC RX 255 CONTRASTS: Performed by: OBSTETRICS & GYNECOLOGY

## 2025-02-25 RX ADMIN — IOHEXOL 60 ML: 240 INJECTION, SOLUTION INTRATHECAL; INTRAVASCULAR; INTRAVENOUS; ORAL at 09:16

## 2025-02-25 NOTE — PROGRESS NOTES
Patient presents for an HSG, POCT UPT negative today, see [facility] Radiology schedule for notation. Rossy Chiu CNP 02/25/25 4:55 PM

## 2025-02-25 NOTE — PROCEDURES
Hysterosalpingogram (HSG)    Date/Time: 2/25/2025 2:48 PM    Performed by: CORINNE Gray  Authorized by: CORINNE Gary    Consent:     Consent obtained:  Verbal and written    Consent given by:  Patient    Risks, benefits, and alternatives were discussed: yes      Risks discussed:  Bleeding, infection and pain  Universal protocol:     Procedure explained and questions answered to patient or proxy's satisfaction: yes      Relevant documents present and verified: yes      Test results available: yes      Imaging studies available: yes      Required blood products, implants, devices, and special equipment available: yes      Immediately prior to procedure, a time out was called: yes      Patient identity confirmed:  Verbally with patient, hospital-assigned identification number and arm band  Indications:     Indications:  Fertility testing  Pre-procedure details:     Skin preparation:  Povidone-iodine  Sedation:     Sedation type:  None  Anesthesia:     Anesthesia method:  None  Procedure specific details:      KATY Chiu CNP performed this procedure      Hysterosalpingogram (HSG) risks, benefits, alternatives, and personnel discussed with patient who agreed to proceed.    Procedural time out        Done in room where procedure done: Yes        Done just before starting procedure: Yes                                                 All members of procedural team involved in time-out: Yes                  Active communication used: Yes                                                         All team members agreed on procedure: Yes                                        Patient correctly identified by two identifiers: Yes                                  Correct side and site identified: Yes                                                     All needed special equipment/instruments available: Yes               Prior to the start of the procedure a time out was taken and the following were verified: The  identity of the patient using two patient identifiers.   Urine pregnancy test was performed and was negative.   Risks, benefits, and alternatives of the procedure were explained to the patient.  Informed consent was obtained.     The patient was placed in the dorsal lithotomy position and a sterile speculum was placed in the vagina. The cervix was sterilized with Betadine x 3. The anterior lip of the cervix was grasped with a single-tooth tenaculum. The acorn cannula used initially with poor fill & switched to balloon catheter & was then placed in the cervix. The patient was positioned and images were taken with fluoroscopy as dye was inserted through the catheter. All instruments were then removed. The patient tolerated the procedure well and was discharged home the same day without complications.    Uterus:  normal contour without filling defects  Tubes:  left tube fills to mid portion and consistent with left salpingectomy, right tube fills to distal and no spill seen.   Based on these findings, my recommendation is: Follow up required, chart forwarded to primary MD. Dr. Grover reviewed the images and agrees with the above findings. H/O ruptured left ectopic 6-7-2024.     The patient was counseled regarding the above findings and images were reviewed with patient at the time of the exam. Patient to review the results with DR. Johnston at [facility] tomorrow.     Rossy Chiu CNP 02/25/25 2:57 PM       Post-procedure details:     Procedure completion:  Tolerated well, no immediate complications

## 2025-02-26 ENCOUNTER — TELEMEDICINE (OUTPATIENT)
Dept: ENDOCRINOLOGY | Facility: CLINIC | Age: 40
End: 2025-02-26
Payer: COMMERCIAL

## 2025-02-26 VITALS — HEIGHT: 68 IN | WEIGHT: 155 LBS | BODY MASS INDEX: 23.49 KG/M2

## 2025-02-26 DIAGNOSIS — Z01.812 ENCOUNTER FOR PREPROCEDURAL LABORATORY EXAMINATION: ICD-10-CM

## 2025-02-26 DIAGNOSIS — Z31.41 FERTILITY TESTING: Primary | ICD-10-CM

## 2025-02-26 PROCEDURE — 99215 OFFICE O/P EST HI 40 MIN: CPT | Performed by: OBSTETRICS & GYNECOLOGY

## 2025-02-26 PROCEDURE — 3008F BODY MASS INDEX DOCD: CPT | Performed by: OBSTETRICS & GYNECOLOGY

## 2025-02-26 PROCEDURE — 1036F TOBACCO NON-USER: CPT | Performed by: OBSTETRICS & GYNECOLOGY

## 2025-02-26 ASSESSMENT — PAIN SCALES - GENERAL: PAINLEVEL_OUTOF10: 3

## 2025-02-26 NOTE — PROGRESS NOTES
Virtual or Telephone Consent: An interactive audio and video telecommunication system which permits real time communications between the patient (at the originating site) and provider (at the distant site) was utilized to provide this telehealth service  MD reviewed, Authorization obtained to share with partner.    Follow Up Visit JACKSON Romano is a 39 y.o.  female presenting today for a follow up visit.  The results of her recent fertility testing are as seen below.   She is status post left partial salpingectomy in  for treatment of ectopic pregnancy.  The patient does report that over the last 8 months she has experienced increased pelvic pain with intercourse and worsening dysmenorrhea.  She desires to discuss options and next steps.    Testing to date:   Component      Latest Ref Rng 2025   ABO TYPE A    Rh Type POS    ANTIBODY SCREEN NEG    ANTI-MULLERIAN HORMONE (AMH), MALE      0.18 - 5.68 ng/mL 1.14    TSH      mIU/L 2.25    PROLACTIN      ng/mL 14.4    RUBELLA AB (IGG), IMMUNE STATUS      Index 1.70    VARICELLA ZOSTER VIRUS ANTIBODY (IGG)      S/CO 14.70    HEPATITIS B SURFACE ANTIGEN      NON-REACTIVE  NON-REACTIVE    T. PALLIDUM AB      Negative  Negative      Component      Latest Ref Rng 2025   DRVVT Screen      RATIO 0.79    DRVVT Confirmation      RATIO 0.90    DRVVT Test Ratio      <=1.20 RATIO 0.87    SCT Screen      RATIO 0.72    SCT Confirmation      RATIO 0.89    SCT Test Ratio      <=1.16 RATIO 0.80    Lupus Anticoagulant Interpretation No evidence of lupus anticoagulant in these assays (DRVVT and Silica Clotting Time (SCT)). Assay interferences may occur in the presence of factor deficiency/inhibitor and/or anticoagulants. For patients on anti-Vitamin K therapy, repeating DRVVT testing might be indicated when the patient is off anti-vitamin K therapy. The DRVVT assay contains a heparin neutralizer up to 1.0 U/mL. Higher concentrations of heparin may cause interferences.  SCT results are not affected by UF heparin up to 0.5 U/mL and LMW Heparin up to 1.0 U/mL. Higher concentrations of heparin may cause interferences. Correlation with clinical findings and clinical history is necessary to assess significance of results in an individual patient.    Anticardiolipin IgA      <20.0 APL U/mL 1.7    Anticardiolipin IgG      <20.0 GPL U/mL <1.6    Anticardiolipin IgM      <20.0 MPL U/mL 6.4    Beta-2 Glyco 1 IgG      <20.0 U/mL <1.4    Beta-2 Glyco 1 IgA      <20.0 U/mL 0.7    Beta 2 Glyco 1 IgM      <20.0 U/mL 3.9    Hemoglobin A1C      See comment % 4.6    Estimated Average Glucose      Not Established mg/dL 85    ALISHA      Negative  Negative    C-Reactive Protein      <1.00 mg/dL 0.29    Sed Rate      0 - 20 mm/h 11    Thyroid Peroxidase (TPO) Antibody      <=60 IU/mL <28    Thyroid Stimulating Hormone      0.44 - 3.98 mIU/L 1.51    Vitamin D, 25-Hydroxy, Total      30 - 100 ng/mL 27 (L)    Vitamin B12      211 - 911 pg/mL 367    HIV 1/2 Antigen/Antibody Screen with Reflex to Confirmation      Nonreactive  Nonreactive    Hepatitis C AB      Nonreactive  Nonreactive      GC/Chlamydia negative    Myriad collected for patient and partner, awaiting results    Hysterosalpingogram: FL HYSTEROSALPINGOGRAM (2/25/2025): Uterus:  normal contour without filling defects  Tubes:  left tube fills to mid portion and consistent with left salpingectomy, right tube fills to distal and no spill seen.    GYN Pelvic Ultrasound: US PELVIS (10/19/2024):   1. No acute pelvic pathology. Small volume pelvic free fluid likely physiologic.  2. Right corpus luteal cyst measuring 1.7 cm.    Partner SA:  Jonas Seal [birth year]  Component      Latest Ref Rng 2/14/2025   Volume (Semen)      >=1.5 mL 4.70    Concentration(Semen)      >=15 mill/mL 66.67    Total Motility (Semen)      >=40 % 83    Prog. Motility (Semen)      >=32 % 75    Non Prog. Motility (Semen)      % 8    Total No of Sperm (Semen)      >=39 mill 313.33  "   Total No of Motile (Semen)      mill 259.28    % Normal (Semen)      >=4 % 1.3 !       Partner STD negative      Past Medical History:   Diagnosis Date    Abnormal Pap smear of cervix     Herpes      Past Surgical History:   Procedure Laterality Date    ECTOPIC PREGNANCY SURGERY Left      Current Outpatient Medications on File Prior to Visit   Medication Sig Dispense Refill    PRENATAL 2-IRON-FOLIC ACID-OM3 ORAL Take by mouth.       No current facility-administered medications on file prior to visit.       BMI:   BMI Readings from Last 1 Encounters:   02/26/25 23.57 kg/m²     VITALS:  Ht 1.727 m (5' 8\")   Wt 70.3 kg (155 lb)   LMP 02/14/2025 (Exact Date)   BMI 23.57 kg/m²   LMP: Patient's last menstrual period was 02/14/2025 (exact date).    This is a telehealth visit    ASSESSMENT   Debora is a 39-year-old female who returns today for follow-up visit.  She recently completed fertility testing, and is status post partial left salpingectomy for treatment of ectopic pregnancy in 2024.  Results of this couples fertility testing were discussed at length, and all questions were answered to her satisfaction.  Unfortunately available imaging cannot clearly demonstrate patency of her remaining fallopian tube, as such various options moving forward were reviewed.  Risks, benefits, and details of various options were discussed including SIS with bubble test versus surgery versus repeat HSG versus HSG under anesthesia.  Patient elected for SIS with bubble test.  Will await myriad testing results for patient and partner.  Follow-up once complete.  Patient will call in if she has continued issues with dysmenorrhea and desires to proceed with surgery.      PLAN  Orders Placed This Encounter   Procedures    Sonohysterogram    US sonohysterogram    POCT pregnancy, urine manually resulted   [X ] patient to call with menses to schedule SIS with bubble test  [X ] will await myriad results for patient and partner  [X ] patient " will call in if she has continued issues with dysmenorrhea and desires to proceed with surgery  [X ] follow-up once workup is complete      MD Completion:  Ectopic Risk: Yes  Medically Complex: No  Outstanding boarding pass items: As above    Fertility Plan Update: Complete SIS, await myriad results, follow-up to discuss options and next steps    Intimate Exam Performed: No, an intimate exam was not performed at this encounter.     Jimbo Johnston  02/26/2025  1:42 PM

## 2025-03-12 ENCOUNTER — E-VISIT (OUTPATIENT)
Dept: PRIMARY CARE | Facility: CLINIC | Age: 40
End: 2025-03-12
Payer: COMMERCIAL

## 2025-03-12 ENCOUNTER — APPOINTMENT (OUTPATIENT)
Dept: PRIMARY CARE | Facility: CLINIC | Age: 40
End: 2025-03-12
Payer: COMMERCIAL

## 2025-03-12 ASSESSMENT — LIFESTYLE VARIABLES: HISTORY_OF_SMOKING: I HAVE NEVER SMOKED

## 2025-03-18 ENCOUNTER — OFFICE VISIT (OUTPATIENT)
Dept: ENDOCRINOLOGY | Facility: CLINIC | Age: 40
End: 2025-03-18
Payer: COMMERCIAL

## 2025-03-18 DIAGNOSIS — Z31.41 FERTILITY TESTING: ICD-10-CM

## 2025-03-18 DIAGNOSIS — Z01.812 ENCOUNTER FOR PREPROCEDURAL LABORATORY EXAMINATION: ICD-10-CM

## 2025-03-18 LAB — PREGNANCY TEST URINE, POC: NEGATIVE

## 2025-03-18 PROCEDURE — 58340 CATHETER FOR HYSTEROGRAPHY: CPT | Performed by: NURSE PRACTITIONER

## 2025-03-18 PROCEDURE — 76831 ECHO EXAM UTERUS: CPT | Performed by: NURSE PRACTITIONER

## 2025-03-18 NOTE — Clinical Note
Unable to confirm patency of her right tube with bubble test. Not sure if you want to reach out to discuss options.  Melany Yanez 03/18/25 3:11 PM

## 2025-03-18 NOTE — PROGRESS NOTES
"Patient ID: Rosalina Castillo \"Ledy" is a 39 y.o. female.    Sonohysterogram    Date/Time: 3/18/2025 3:10 PM    Performed by: CORINNE Gates  Authorized by: CORINNE Gates    Consent:     Consent obtained:  Verbal and written    Consent given by:  Patient    Patient questions answered: yes      Patient agrees, verbalizes understanding, and wants to proceed: yes      Instructions and paperwork completed: yes    Pre-procedure:     Pre-procedure timeout performed: yes      Prepped with: Betadine    Procedure:     Cervix cleaned and prepped: yes      Catheter inserted: yes      Uterine cavity distended with saline: yes    Post-procedure:     No complications: no      Estimated blood loss (mL): minimal.    Post procedure instructions given to patient: yes      Patient tolerated procedure well with no complications: yes    Comments:      Prior to the procedure, the patient was counseled regarding risks, benefits, and alternatives.    Patient declined.    Saline Ultrasound Findings:  Uterus:  normal contour without filling defects  Bubble Test performed: Yes Unable to confirm patency. Bubble travel to the right conrua, but stop. Large amount of bowel noted near ovary. Unable to confirm patency. Left tube surgically removed.  Additional Findings:   Follow up:  Follow up required, chart forwarded to primary MD.      Melany Yanez 03/18/25 3:11 PM              "

## 2025-04-03 ENCOUNTER — TELEPHONE (OUTPATIENT)
Dept: ENDOCRINOLOGY | Facility: CLINIC | Age: 40
End: 2025-04-03
Payer: COMMERCIAL

## 2025-04-03 NOTE — TELEPHONE ENCOUNTER
Caller: Patient  Reason for call: questions regarding next steps  Notes:  Patient of Dr. Johnston. Has an upcoming FUV on 5/13.

## 2025-04-03 NOTE — TELEPHONE ENCOUNTER
Called patient, no answer, left voicemail that there was nothing else for patient to complete prior to her appointment in May.   Can call or send MyChart with any further questions.     Ana Luisa Matthew 04/03/25 11:01 AM

## 2025-04-07 ENCOUNTER — APPOINTMENT (OUTPATIENT)
Dept: OBSTETRICS AND GYNECOLOGY | Facility: CLINIC | Age: 40
End: 2025-04-07
Payer: COMMERCIAL

## 2025-05-01 ENCOUNTER — APPOINTMENT (OUTPATIENT)
Dept: OBSTETRICS AND GYNECOLOGY | Facility: CLINIC | Age: 40
End: 2025-05-01
Payer: COMMERCIAL

## 2025-05-01 VITALS — BODY MASS INDEX: 23.6 KG/M2 | DIASTOLIC BLOOD PRESSURE: 78 MMHG | SYSTOLIC BLOOD PRESSURE: 120 MMHG | WEIGHT: 155.2 LBS

## 2025-05-01 DIAGNOSIS — Z01.419 ENCOUNTER FOR ANNUAL ROUTINE GYNECOLOGICAL EXAMINATION: Primary | ICD-10-CM

## 2025-05-01 DIAGNOSIS — Z87.59 HISTORY OF ECTOPIC PREGNANCY: ICD-10-CM

## 2025-05-01 LAB — PREGNANCY TEST URINE, POC: NEGATIVE

## 2025-05-01 PROCEDURE — 81025 URINE PREGNANCY TEST: CPT | Performed by: OBSTETRICS & GYNECOLOGY

## 2025-05-01 PROCEDURE — 1036F TOBACCO NON-USER: CPT | Performed by: OBSTETRICS & GYNECOLOGY

## 2025-05-01 PROCEDURE — 99395 PREV VISIT EST AGE 18-39: CPT | Performed by: OBSTETRICS & GYNECOLOGY

## 2025-05-01 ASSESSMENT — PATIENT HEALTH QUESTIONNAIRE - PHQ9
2. FEELING DOWN, DEPRESSED OR HOPELESS: NOT AT ALL
1. LITTLE INTEREST OR PLEASURE IN DOING THINGS: NOT AT ALL
SUM OF ALL RESPONSES TO PHQ9 QUESTIONS 1 AND 2: 0

## 2025-05-01 ASSESSMENT — PAIN SCALES - GENERAL: PAINLEVEL_OUTOF10: 2

## 2025-05-01 NOTE — PROGRESS NOTES
"Assessment     PLAN  1. Encounter for annual routine gynecological examination (Primary)  - pap up to date    2. History of ectopic pregnancy  - pregnancy test neg in office today   - POCT pregnancy, urine manually resulted    Please return for your next visit in 1 year or sooner as needed.    Zee Galaviz MD      Subjective     Rosalina Castillo \"Ledy" is a 39 y.o. female who is here for a routine exam.   PCP = Chely Waldron, APRN-CNP    APE Concerns:   - TTC since , left salpingectomy at [facility] in 2024 for ectopic pregnancy  - feels like she did when she had her last ectopic. Menses due in 3-4 days    Gynecologic History:    OBHx:   Menses: Regular, heavy and crampy the first 2 days  Last Pap: NILM/neg HPV in 3/2023  HPV Vaccine: Done  History of Dysplasia: yes, had colpo, no LEEP  Sexually active: active with   STI testing: Declines  Contraception: Denies, TTC  Mammogram: NA  FamHx of GYN cancers:  great great grandma had ovarian cancer, no breast cancer      PMH, PSH, FH, SH, medications and allergies reviewed and edited as needed.      Objective   /78 (BP Location: Right arm, Patient Position: Sitting, BP Cuff Size: Adult)   Wt 70.4 kg (155 lb 3.2 oz)   LMP 2025 (Exact Date)   BMI 23.60 kg/m²      General:   Alert and oriented, in no acute distress   Neck: Supple. No visible thyromegaly.    Breast/Axilla: Normal to palpation bilaterally without masses, skin changes, or nipple discharge.    Abdomen: Soft, non-tender, without masses or organomegaly   Vulva: Normal architecture without erythema, masses, or lesions.    Vagina: Normal mucosa without lesions, masses, or atrophy. No abnormal vaginal discharge.    Cervix: Normal without masses, lesions, or signs of cervicitis.    Uterus: Normal mobile, non-enlarged uterus    Adnexa: Normal without masses or lesions   Pelvic Floor No POP noted. No high tone pelvic floor   Psych Normal affect. Normal mood.        "

## 2025-05-02 ENCOUNTER — APPOINTMENT (OUTPATIENT)
Dept: DERMATOLOGY | Facility: CLINIC | Age: 40
End: 2025-05-02
Payer: COMMERCIAL

## 2025-05-13 ENCOUNTER — PREP FOR PROCEDURE (OUTPATIENT)
Dept: ENDOCRINOLOGY | Facility: CLINIC | Age: 40
End: 2025-05-13

## 2025-05-13 ENCOUNTER — TELEMEDICINE (OUTPATIENT)
Dept: ENDOCRINOLOGY | Facility: CLINIC | Age: 40
End: 2025-05-13
Payer: COMMERCIAL

## 2025-05-13 DIAGNOSIS — G89.29 CHRONIC PELVIC PAIN IN FEMALE: Primary | ICD-10-CM

## 2025-05-13 DIAGNOSIS — N94.6 DYSMENORRHEA: ICD-10-CM

## 2025-05-13 DIAGNOSIS — R10.2 CHRONIC PELVIC PAIN IN FEMALE: Primary | ICD-10-CM

## 2025-05-13 PROCEDURE — 99215 OFFICE O/P EST HI 40 MIN: CPT | Performed by: OBSTETRICS & GYNECOLOGY

## 2025-05-13 RX ORDER — GABAPENTIN 600 MG/1
600 TABLET ORAL ONCE
OUTPATIENT
Start: 2025-05-13 | End: 2025-05-13

## 2025-05-13 RX ORDER — CELECOXIB 400 MG/1
400 CAPSULE ORAL ONCE
OUTPATIENT
Start: 2025-05-13 | End: 2025-05-13

## 2025-05-13 RX ORDER — ACETAMINOPHEN 325 MG/1
975 TABLET ORAL ONCE
OUTPATIENT
Start: 2025-05-13 | End: 2025-05-13

## 2025-05-13 NOTE — PROGRESS NOTES
Virtual or Telephone Consent: An interactive audio and video telecommunication system which permits real time communications between the patient (at the originating site) and provider (at the distant site) was utilized to provide this telehealth service  MD reviewed, Authorization obtained to share with partner.    Follow Up Visit JACKSON Romano is a 39 y.o.  female presenting today for a follow up visit.  She is status post left partial salpingectomy in  for treatment of ectopic pregnancy.  Recent HSG and HyCoSy testing are unable to confirm tubal patency of her remaining tube as seen below.  Patient reports that she has hide continued pelvic pain since her last visit with us and she does find this to be bothersome.  It is worse with menses and following intercourse, and she has recently sought evaluation and treatment with pelvic floor physical therapy.  This is provided minimal relief.  At this point she feels as if she is ready to proceed with surgery.  Results of testing are as seen below.    Testing to date:   Component      Latest Ref Rn 2025   ABO TYPE A    Rh Type POS    ANTIBODY SCREEN NEG    ANTI-MULLERIAN HORMONE (AMH), MALE      0.18 - 5.68 ng/mL 1.14    TSH      mIU/L 2.25    PROLACTIN      ng/mL 14.4    RUBELLA AB (IGG), IMMUNE STATUS      Index 1.70    VARICELLA ZOSTER VIRUS ANTIBODY (IGG)      S/CO 14.70    HEPATITIS B SURFACE ANTIGEN      NON-REACTIVE  NON-REACTIVE    T. PALLIDUM AB      Negative  Negative       Component      Latest Ref Rng 2025   DRVVT Screen      RATIO 0.79    DRVVT Confirmation      RATIO 0.90    DRVVT Test Ratio      <=1.20 RATIO 0.87    SCT Screen      RATIO 0.72    SCT Confirmation      RATIO 0.89    SCT Test Ratio      <=1.16 RATIO 0.80    Lupus Anticoagulant Interpretation No evidence of lupus anticoagulant in these assays (DRVVT and Silica Clotting Time (SCT)). Assay interferences may occur in the presence of factor deficiency/inhibitor and/or  anticoagulants. For patients on anti-Vitamin K therapy, repeating DRVVT testing might be indicated when the patient is off anti-vitamin K therapy. The DRVVT assay contains a heparin neutralizer up to 1.0 U/mL. Higher concentrations of heparin may cause interferences. SCT results are not affected by UF heparin up to 0.5 U/mL and LMW Heparin up to 1.0 U/mL. Higher concentrations of heparin may cause interferences. Correlation with clinical findings and clinical history is necessary to assess significance of results in an individual patient.    Anticardiolipin IgA      <20.0 APL U/mL 1.7    Anticardiolipin IgG      <20.0 GPL U/mL <1.6    Anticardiolipin IgM      <20.0 MPL U/mL 6.4    Beta-2 Glyco 1 IgG      <20.0 U/mL <1.4    Beta-2 Glyco 1 IgA      <20.0 U/mL 0.7    Beta 2 Glyco 1 IgM      <20.0 U/mL 3.9    Hemoglobin A1C      See comment % 4.6    Estimated Average Glucose      Not Established mg/dL 85    ALISHA      Negative  Negative    C-Reactive Protein      <1.00 mg/dL 0.29    Sed Rate      0 - 20 mm/h 11    Thyroid Peroxidase (TPO) Antibody      <=60 IU/mL <28    Thyroid Stimulating Hormone      0.44 - 3.98 mIU/L 1.51    Vitamin D, 25-Hydroxy, Total      30 - 100 ng/mL 27 (L)    Vitamin B12      211 - 911 pg/mL 367    HIV 1/2 Antigen/Antibody Screen with Reflex to Confirmation      Nonreactive  Nonreactive    Hepatitis C AB      Nonreactive  Nonreactive       GC/Chlamydia negative    [facility]   Patient - negative  Partner - carrier of Gaucher Disease and POLG related disorders     Hysterosalpingogram: FL HYSTEROSALPINGOGRAM (2/25/2025):   Uterus:  normal contour without filling defects  Tubes:  left tube fills to mid portion and consistent with left salpingectomy, right tube fills to distal and no spill seen.     Sonohysterogram 3/18/2025:  Retroverted uterus with a trilaminar endometrial lining that measures as below. The ovaries are normal in appearance bilaterally. Sonohysterogram reveals a normal  appearing  uterine cavity with no evidence of intracavitary lesion or defect. HyCoSy was performed, however, tubal patency cannot be confirmed on this exam.    GYN Pelvic Ultrasound: US PELVIS (10/19/2024):   1. No acute pelvic pathology. Small volume pelvic free fluid likely physiologic.  2. Right corpus luteal cyst measuring 1.7 cm.     Partner SA: Jonas Castillo 85  Component      Latest Ref Rng 2025   Volume (Semen)      >=1.5 mL 4.70    Concentration(Semen)      >=15 mill/mL 66.67    Total Motility (Semen)      >=40 % 83    Prog. Motility (Semen)      >=32 % 75    Non Prog. Motility (Semen)      % 8    Total No of Sperm (Semen)      >=39 mill 313.33    Total No of Motile (Semen)      mill 259.28    % Normal (Semen)      >=4 % 1.3 !       Treatment to date:   Fertility Cycles       Cycle Name Treatment Start Date Type Outcome    Cycle Created on 3/6/2025   Active            Medical History[1]  Surgical History[2]  Medications Ordered Prior to Encounter[3]    BMI:   BMI Readings from Last 1 Encounters:   25 23.60 kg/m²     VITALS:  LMP 2025 (Exact Date)   LMP: Patient's last menstrual period was 2025 (exact date).    GEN: alert and oriented, cooperative, no distress, appears stated age  Psych:  Exhibits appropriate mood and judgement.  HEENT: NC/AT  Resp: Normal respiratory effort, no use of accessory muscles  Ext: No clubbing, cyanosis, or edema    ASSESSMENT   Rosalina is a 39 y.o.  female who presents today for a follow-up visit.  She has chronic pelvic pain/dysmenorrhea/dyspareunia and recent imaging is inconclusive regarding patency of her remaining fallopian tube.  She is status post partial left salpingectomy for treatment of ectopic pregnancy.  Results of this couples fertility testing were discussed at length, and all questions were answered to the patient's satisfaction.  Pros and cons of various options moving forward were discussed, including proceeding with additional imaging  such as HSG, proceeding with laparoscopy, and proceeding directly to IVF.  There also is the possibility of proceeding with treatment such as ovulation induction or IUI, however given uncertain tubal patency status there is a chance that these treatments may be futile.  Patient verbalizes understanding, and reports that she is interested in proceeding with laparoscopy.  Will submit orders to our office to surgical scheduler for laparoscopy, possible lysis of adhesions/excision of endometriosis, chromopertubation, possible neosalpingostomy versus salpingectomy, hysteroscopy.  Patient is to follow-up with me for preoperative appointment once the procedure has been scheduled.    Routine Testing  Fertility Center  STDs Within 1 year   Genetic carrier Waiver/Completed   T&S Within 1 year   AMH Within 1 year   TSH Within 1 year   Rubella/Varicella Within 5 years     BMI Testing [facility]  CBC Within 1 year   CMP Within 1 year   HgbA1c Within 1 year   Mag, Phos, Vit D <18 Within 1 year   MFM > 40  REQ   Wt loss consult > 40 OPT     PLAN  [X ] Will submit orders to our office to surgical scheduler for laparoscopy, possible lysis of adhesions/excision of endometriosis, chromopertubation, possible neosalpingostomy versus salpingectomy, hysteroscopy.    [X ] Patient is to follow-up with me for preoperative appointment once the procedure has been scheduled.    FOLLOW UP   Preoperative appointment    MD Completion:  Ectopic Risk: Yes  Medically Complex: No  Outstanding boarding pass items: as above    Fertility Plan Update: Proceed with laparoscopy     Intimate Exam Performed: No, an intimate exam was not performed at this encounter.     Jimbo Johnston  05/13/2025  10:55 AM             [1]   Past Medical History:  Diagnosis Date    Abnormal Pap smear of cervix     Herpes    [2]   Past Surgical History:  Procedure Laterality Date    ECTOPIC PREGNANCY SURGERY Left    [3]   Current Outpatient Medications on File Prior  to Visit   Medication Sig Dispense Refill    PRENATAL 2-IRON-FOLIC ACID-OM3 ORAL Take by mouth.       No current facility-administered medications on file prior to visit.

## 2025-05-16 ENCOUNTER — APPOINTMENT (OUTPATIENT)
Dept: PRIMARY CARE | Facility: CLINIC | Age: 40
End: 2025-05-16
Payer: COMMERCIAL

## 2025-05-20 ENCOUNTER — HOSPITAL ENCOUNTER (OUTPATIENT)
Facility: HOSPITAL | Age: 40
Setting detail: OUTPATIENT SURGERY
End: 2025-05-20
Attending: OBSTETRICS & GYNECOLOGY | Admitting: OBSTETRICS & GYNECOLOGY
Payer: COMMERCIAL

## 2025-05-20 PROBLEM — N94.6 DYSMENORRHEA: Status: ACTIVE | Noted: 2025-05-13

## 2025-05-20 PROBLEM — R10.2 CHRONIC PELVIC PAIN IN FEMALE: Status: ACTIVE | Noted: 2025-05-13

## 2025-05-20 PROBLEM — G89.29 CHRONIC PELVIC PAIN IN FEMALE: Status: ACTIVE | Noted: 2025-05-13

## 2025-05-27 ENCOUNTER — APPOINTMENT (OUTPATIENT)
Dept: LAB | Facility: HOSPITAL | Age: 40
End: 2025-05-27
Payer: COMMERCIAL

## 2025-05-27 ENCOUNTER — LAB REQUISITION (OUTPATIENT)
Dept: LAB | Facility: HOSPITAL | Age: 40
End: 2025-05-27

## 2025-05-27 DIAGNOSIS — O09.11 PREGNANCY IN FIRST TRIMESTER WITH HISTORY OF ECTOPIC PREGNANCY (HHS-HCC): Primary | ICD-10-CM

## 2025-05-27 DIAGNOSIS — O26.859 SPOTTING COMPLICATING PREGNANCY, UNSPECIFIED TRIMESTER (HHS-HCC): ICD-10-CM

## 2025-05-27 DIAGNOSIS — O20.0 BLOODY SHOW AND CRAMPING IN EARLY PREGNANCY: Primary | ICD-10-CM

## 2025-05-27 DIAGNOSIS — O26.859 SPOTTING IN EARLY PREGNANCY (HHS-HCC): ICD-10-CM

## 2025-05-27 LAB
ANION GAP SERPL CALC-SCNC: 12 MMOL/L (ref 10–20)
B-HCG SERPL-ACNC: ABNORMAL MIU/ML
BUN SERPL-MCNC: 12 MG/DL (ref 6–23)
CALCIUM SERPL-MCNC: 9 MG/DL (ref 8.6–10.3)
CHLORIDE SERPL-SCNC: 101 MMOL/L (ref 98–107)
CO2 SERPL-SCNC: 24 MMOL/L (ref 21–32)
CREAT SERPL-MCNC: 0.65 MG/DL (ref 0.5–1.05)
EGFRCR SERPLBLD CKD-EPI 2021: >90 ML/MIN/1.73M*2
ERYTHROCYTE [DISTWIDTH] IN BLOOD BY AUTOMATED COUNT: 12.9 % (ref 11.5–14.5)
GLUCOSE SERPL-MCNC: 82 MG/DL (ref 74–99)
HCT VFR BLD AUTO: 40.6 % (ref 36–46)
HGB BLD-MCNC: 13.4 G/DL (ref 12–16)
MCH RBC QN AUTO: 31.8 PG (ref 26–34)
MCHC RBC AUTO-ENTMCNC: 33 G/DL (ref 32–36)
MCV RBC AUTO: 96 FL (ref 80–100)
NRBC BLD-RTO: 0 /100 WBCS (ref 0–0)
PLATELET # BLD AUTO: 304 X10*3/UL (ref 150–450)
POTASSIUM SERPL-SCNC: 4 MMOL/L (ref 3.5–5.3)
RBC # BLD AUTO: 4.21 X10*6/UL (ref 4–5.2)
SODIUM SERPL-SCNC: 133 MMOL/L (ref 136–145)
WBC # BLD AUTO: 9.6 X10*3/UL (ref 4.4–11.3)

## 2025-05-27 PROCEDURE — 84702 CHORIONIC GONADOTROPIN TEST: CPT

## 2025-05-27 PROCEDURE — 80048 BASIC METABOLIC PNL TOTAL CA: CPT

## 2025-05-27 PROCEDURE — 85027 COMPLETE CBC AUTOMATED: CPT

## 2025-05-29 DIAGNOSIS — Z87.59 HISTORY OF ECTOPIC PREGNANCY: Primary | ICD-10-CM

## 2025-05-30 ENCOUNTER — HOSPITAL ENCOUNTER (OUTPATIENT)
Dept: RADIOLOGY | Facility: CLINIC | Age: 40
Discharge: HOME | End: 2025-05-30
Payer: COMMERCIAL

## 2025-05-30 DIAGNOSIS — O26.859 SPOTTING IN EARLY PREGNANCY (HHS-HCC): ICD-10-CM

## 2025-05-30 DIAGNOSIS — O21.9 NAUSEA AND VOMITING IN PREGNANCY PRIOR TO 22 WEEKS GESTATION: Primary | ICD-10-CM

## 2025-05-30 DIAGNOSIS — Z87.59 HISTORY OF ECTOPIC PREGNANCY: ICD-10-CM

## 2025-05-30 PROCEDURE — 76801 OB US < 14 WKS SINGLE FETUS: CPT

## 2025-05-30 PROCEDURE — 76817 TRANSVAGINAL US OBSTETRIC: CPT

## 2025-05-30 RX ORDER — METOCLOPRAMIDE 10 MG/1
10 TABLET ORAL 4 TIMES DAILY
Qty: 30 TABLET | Refills: 2 | Status: SHIPPED | OUTPATIENT
Start: 2025-05-30 | End: 2025-06-29

## 2025-05-30 RX ORDER — PYRIDOXINE HCL (VITAMIN B6) 25 MG
25 TABLET ORAL EVERY 8 HOURS
Qty: 90 TABLET | Refills: 1 | Status: SHIPPED | OUTPATIENT
Start: 2025-05-30 | End: 2025-08-28

## 2025-06-02 ENCOUNTER — APPOINTMENT (OUTPATIENT)
Dept: OBSTETRICS AND GYNECOLOGY | Facility: CLINIC | Age: 40
End: 2025-06-02
Payer: COMMERCIAL

## 2025-06-02 VITALS — DIASTOLIC BLOOD PRESSURE: 70 MMHG | BODY MASS INDEX: 23.6 KG/M2 | WEIGHT: 155.2 LBS | SYSTOLIC BLOOD PRESSURE: 108 MMHG

## 2025-06-02 DIAGNOSIS — Z3A.08 8 WEEKS GESTATION OF PREGNANCY (HHS-HCC): ICD-10-CM

## 2025-06-02 DIAGNOSIS — O21.9 NAUSEA AND VOMITING IN PREGNANCY PRIOR TO 22 WEEKS GESTATION: Primary | ICD-10-CM

## 2025-06-02 DIAGNOSIS — Z11.3 SCREEN FOR SEXUALLY TRANSMITTED DISEASES: ICD-10-CM

## 2025-06-02 PROBLEM — Z32.00 PREGNANCY EXAMINATION OR TEST, PREGNANCY UNCONFIRMED: Status: ACTIVE | Noted: 2025-01-22

## 2025-06-02 PROBLEM — Z87.11 HISTORY OF STOMACH ULCERS: Status: RESOLVED | Noted: 2025-01-22 | Resolved: 2025-06-02

## 2025-06-02 PROCEDURE — 0500F INITIAL PRENATAL CARE VISIT: CPT | Performed by: OBSTETRICS & GYNECOLOGY

## 2025-06-02 ASSESSMENT — PATIENT HEALTH QUESTIONNAIRE - PHQ9
2. FEELING DOWN, DEPRESSED OR HOPELESS: NOT AT ALL
SUM OF ALL RESPONSES TO PHQ9 QUESTIONS 1 AND 2: 0
1. LITTLE INTEREST OR PLEASURE IN DOING THINGS: NOT AT ALL

## 2025-06-02 ASSESSMENT — PAIN - FUNCTIONAL ASSESSMENT: PAIN_FUNCTIONAL_ASSESSMENT: 0-10

## 2025-06-02 ASSESSMENT — PAIN SCALES - GENERAL: PAINLEVEL_OUTOF10: 4

## 2025-06-02 NOTE — ASSESSMENT & PLAN NOTE
Prenatal labs ordered, already had type and screen and CBC  Discussed genetic testing, desires cfDNA, ordered  Already had neg carrier testing   Discussed baby aspirin

## 2025-06-02 NOTE — PROGRESS NOTES
NEW OB VISIT    Assessment/Plan    Problem List Items Addressed This Visit          Pregnancy    Nausea and vomiting in pregnancy prior to 22 weeks gestation - Primary    Current Assessment & Plan   Taking unisom, B6 and reglan         8 weeks gestation of pregnancy ([facility])    Overview   Desired provider in labor: [] CNM  [] Physician   [] Either Acceptable  [] Blood Products: [] Yes, accepts [] No, needs counseling  [] Initial BMI: Could not be calculated   [] Prenatal Labs: A+, hgb 13.4  [x] Cervical Cancer Screening up to date: NILM/neg HPV in 3/2023  [x] Rh status: positive  [] Screen for IPV and Substance Use Risk:  [] Genetic Screening (cfDNA):    [] First Trimester Anatomy Screen (11-13.6 wks):  [] Baby ASA (initiated):  [x] Pregnancy dated by: 7 week ultrasound    [] Anatomy US: (19-20 wks)  [] Federal Sterilization consent signed (if indicated):  [] 1hr GCT at 24-28wks:  [x] Rhogam (if indicated): Not indicated  [] Fetal Surveillance (if indicated):  [] Tdap (27-32 wks, may be given up to 36 wks if initial window missed):   [] RSV (32-36 wks) (Sept. to end of Jan):     [] Feeding Intentions:  [] Postpartum Birth control method:   [] GBS at 36 - 37 wks:  [] 39 weeks discussion of IOL vs. Expectant management:  [] Mode of delivery ( anticipated ):           Current Assessment & Plan   Prenatal labs ordered, already had type and screen and CBC  Discussed genetic testing, desires cfDNA, ordered  Already had neg carrier testing   Discussed baby aspirin           Relevant Orders    [facility] Prenatal screen    Hgb  A1C    Hemoglobin identification with path review    Rubella Ab, IgG    Urine Culture clinic collect     Other Visit Diagnoses         Screen for sexually transmitted diseases        Relevant Orders    Hep B surface Ag    Hep C Ab    HIV 1/2 Screen with Reflex    Syphilis Screen with Reflex    C. trachomatis / N. gonorrhoeae, Amplified, Urogenital          Follow up in 4 weeks or sooner as  "needed    Zee Galaviz MD        Subjective     Rosalina Castillo \"Mame\" is a 39 y.o.  at 8w2d with a working estimated date of delivery of 1/10/2026, by Last Menstrual Period who presents for an initial prenatal visit.     C/o significant nausea. Slightly improved with medication.   C/o pelvic cramping, no bleeding  C/o right shoulder pain    OB Hx:   2024: ectopic pregnancy, s/p left salpingectomy  : current  Past Medical Hx: Denies  Past Surgical Hx: lap left salpingectomy  Social Hx: Denies tobacco, alcohol, drugs  Family Hx: denies  Medications: unisom, B6, reglan  Allergies: None  Pap Hx: NILM/neg HPV in 3/2023        Physical Exam  Objective   /70   Wt 70.4 kg (155 lb 3.2 oz)   LMP 2025 (Exact Date)   BMI 23.60 kg/m²      General:   Alert and oriented, in no acute distress         "

## 2025-06-04 LAB
BACTERIA UR CULT: NORMAL
C TRACH RRNA SPEC QL NAA+PROBE: NOT DETECTED
N GONORRHOEA RRNA SPEC QL NAA+PROBE: NOT DETECTED
QUEST GC CT AMPLIFIED (ALWAYS MESSAGE): NORMAL

## 2025-06-09 DIAGNOSIS — O21.9 NAUSEA AND VOMITING IN PREGNANCY PRIOR TO 22 WEEKS GESTATION: Primary | ICD-10-CM

## 2025-06-09 RX ORDER — ONDANSETRON 4 MG/1
4 TABLET, FILM COATED ORAL EVERY 8 HOURS PRN
Qty: 30 TABLET | Refills: 1 | Status: SHIPPED | OUTPATIENT
Start: 2025-06-09 | End: 2025-07-09

## 2025-06-13 ENCOUNTER — LAB (OUTPATIENT)
Dept: LAB | Facility: HOSPITAL | Age: 40
End: 2025-06-13
Payer: COMMERCIAL

## 2025-06-13 PROCEDURE — 83020 HEMOGLOBIN ELECTROPHORESIS: CPT

## 2025-06-13 PROCEDURE — 83021 HEMOGLOBIN CHROMOTOGRAPHY: CPT

## 2025-06-15 LAB
EST. AVERAGE GLUCOSE BLD GHB EST-MCNC: 97 MG/DL
EST. AVERAGE GLUCOSE BLD GHB EST-SCNC: 5.4 MMOL/L
HBA1C MFR BLD: 5 %
HBV SURFACE AG SERPL QL IA: NORMAL
HCV AB SERPL QL IA: NORMAL
HIV 1+2 AB+HIV1 P24 AG SERPL QL IA: NORMAL
RUBV IGG SERPL IA-ACNC: 1.68 INDEX
T PALLIDUM AB SER QL IA: NORMAL

## 2025-06-16 LAB
HEMOGLOBIN A2: 3.3 % (ref 2–3.5)
HEMOGLOBIN A: 95.1 % (ref 95.8–98)
HEMOGLOBIN F: 1.6 % (ref 0–2)
HEMOGLOBIN IDENTIFICATION INTERPRETATION: NORMAL
PATH REVIEW-HGB IDENTIFICATION: ABNORMAL

## 2025-06-17 LAB
EST. AVERAGE GLUCOSE BLD GHB EST-MCNC: 97 MG/DL
EST. AVERAGE GLUCOSE BLD GHB EST-SCNC: 5.4 MMOL/L
HBA1C MFR BLD: 5 %
HBV SURFACE AG SERPL QL IA: NORMAL
HCV AB SERPL QL IA: NORMAL
HIV 1+2 AB+HIV1 P24 AG SERPL QL IA: NORMAL
RUBV IGG SERPL IA-ACNC: 1.68 INDEX
T PALLIDUM AB SER QL IA: NEGATIVE

## 2025-07-01 ENCOUNTER — TELEPHONE (OUTPATIENT)
Dept: OBSTETRICS AND GYNECOLOGY | Facility: CLINIC | Age: 40
End: 2025-07-01
Payer: COMMERCIAL

## 2025-07-02 ENCOUNTER — TELEPHONE (OUTPATIENT)
Dept: OBSTETRICS AND GYNECOLOGY | Facility: CLINIC | Age: 40
End: 2025-07-02
Payer: COMMERCIAL

## 2025-07-02 DIAGNOSIS — O26.891 HEADACHE IN PREGNANCY, ANTEPARTUM, FIRST TRIMESTER (HHS-HCC): Primary | ICD-10-CM

## 2025-07-02 DIAGNOSIS — R51.9 HEADACHE IN PREGNANCY, ANTEPARTUM, FIRST TRIMESTER (HHS-HCC): Primary | ICD-10-CM

## 2025-07-02 RX ORDER — RIBOFLAVIN (VITAMIN B2) 400 MG
400 TABLET ORAL DAILY
Qty: 30 TABLET | Refills: 11 | Status: SHIPPED | OUTPATIENT
Start: 2025-07-02 | End: 2026-07-02

## 2025-07-02 RX ORDER — CALCIUM CARBONATE 300MG(750)
400 TABLET,CHEWABLE ORAL DAILY
Qty: 30 TABLET | Refills: 11 | Status: SHIPPED | OUTPATIENT
Start: 2025-07-02 | End: 2026-07-02

## 2025-07-02 NOTE — TELEPHONE ENCOUNTER
Called patient. Patient stated she is has been having a pounding headache at the same time for the last 4 days. Patient stated she has been taking tylenol that does not help, tried ice packs, heating pads. Patient just got back from [place] and was hiking. Patient stated she was staying hydrated. With the headache it is associated with nausea. Patient been taking zofran and vitamin b 6.    Currently patient stated the headache is mild and she just feels exhausted.    Patient had headaches at the beginning of her pregnancy. Patient stopped drinking caffeine and Dr. Galaviz told her to start drinking caffeine again that helped with the headaches in the beginning.    Patient denies any visual changes, swelling, or epigastric pain.     Patient is having intermittently cramping that comes and goes does not last long.     Discuss with patient signs and symptoms to watch out for and when to report to labor and delivery. Patient verbalized understanding.    Patient has an RPN appointment with Dr. Galaviz on Monday.

## 2025-07-04 LAB — COMMENTS - MP RESULT TYPE: NORMAL

## 2025-07-07 ENCOUNTER — APPOINTMENT (OUTPATIENT)
Dept: OBSTETRICS AND GYNECOLOGY | Facility: CLINIC | Age: 40
End: 2025-07-07
Payer: COMMERCIAL

## 2025-07-07 ENCOUNTER — HOSPITAL ENCOUNTER (OUTPATIENT)
Dept: RADIOLOGY | Facility: CLINIC | Age: 40
Discharge: HOME | End: 2025-07-07
Payer: COMMERCIAL

## 2025-07-07 VITALS — WEIGHT: 158 LBS | BODY MASS INDEX: 24.02 KG/M2 | SYSTOLIC BLOOD PRESSURE: 104 MMHG | DIASTOLIC BLOOD PRESSURE: 70 MMHG

## 2025-07-07 DIAGNOSIS — O26.892 HEADACHE IN PREGNANCY, ANTEPARTUM, SECOND TRIMESTER (HHS-HCC): ICD-10-CM

## 2025-07-07 DIAGNOSIS — R51.9 HEADACHE IN PREGNANCY, ANTEPARTUM, SECOND TRIMESTER (HHS-HCC): ICD-10-CM

## 2025-07-07 DIAGNOSIS — Z3A.13 13 WEEKS GESTATION OF PREGNANCY (HHS-HCC): Primary | ICD-10-CM

## 2025-07-07 DIAGNOSIS — O21.9 NAUSEA AND VOMITING IN PREGNANCY PRIOR TO 22 WEEKS GESTATION: ICD-10-CM

## 2025-07-07 DIAGNOSIS — Z87.59 HISTORY OF ECTOPIC PREGNANCY: ICD-10-CM

## 2025-07-07 PROCEDURE — 76813 OB US NUCHAL MEAS 1 GEST: CPT | Performed by: OBSTETRICS & GYNECOLOGY

## 2025-07-07 PROCEDURE — 0501F PRENATAL FLOW SHEET: CPT | Performed by: OBSTETRICS & GYNECOLOGY

## 2025-07-07 PROCEDURE — 76816 OB US FOLLOW-UP PER FETUS: CPT

## 2025-07-07 PROCEDURE — 76813 OB US NUCHAL MEAS 1 GEST: CPT

## 2025-07-07 RX ORDER — CYPROHEPTADINE HYDROCHLORIDE 4 MG/1
4 TABLET ORAL 3 TIMES DAILY PRN
Qty: 30 TABLET | Refills: 0 | Status: SHIPPED | OUTPATIENT
Start: 2025-07-07 | End: 2025-10-05

## 2025-07-07 ASSESSMENT — PAIN SCALES - GENERAL: PAINLEVEL_OUTOF10: 6

## 2025-07-07 NOTE — PROGRESS NOTES
"Ob Follow-up  25     SUBJECTIVE      HPI: Rosalina Castillo \"Mame\" is a 40 y.o.  at 13w2d here for RPNV.  Patient reports severe headaches.     Non responsive to tylenol. Stopped taking it.   Tried mag oxide and B2 without relief  9 days of headache. Waxes and wanes.   Constipation -= will start stool softener  She is considering requesting an elective primary c/s   Taking baby aspirin    OBJECTIVE  Visit Vitals  /70   Wt 71.7 kg (158 lb)   LMP 2025 (Exact Date)   BMI 24.02 kg/m²   OB Status Pregnant   Smoking Status Never   BSA 1.85 m²        ASSESSMENT & PLAN    Rosalina Castillo \"Mame\" is a 40 y.o.  at 13w2d here for the following concerns we addressed today:    Problem List Items Addressed This Visit          Pregnancy    Nausea and vomiting in pregnancy prior to 22 weeks gestation    Overview   Improved but not resolved  Was too sleepy with doxylamine. Will try breaking it in half since it was helping nausea         Headache in pregnancy, antepartum, second trimester ([facility])    Overview   No relief with tylenol or mag oxide  Has reglan at home she will try  Rx sent for periactin to try  If no relief then will consider a neurology outpatient consult         Relevant Medications    cyproheptadine (Periactin) 4 mg tablet    13 weeks gestation of pregnancy ([facility]) - Primary    Overview   Desired provider in labor: [] CNM  [] Physician   [] Either Acceptable  [x] Blood Products: [x] Yes, accepts [] No, needs counseling  [] Initial BMI: Could not be calculated   [] Prenatal Labs: A+, hgb 13.4  [x] Cervical Cancer Screening up to date: NILM/neg HPV in 3/2023  [x] Rh status: positive  [] Screen for IPV and Substance Use Risk:  [] Genetic Screening (cfDNA):    [] First Trimester Anatomy Screen (11-13.6 wks):  [x] Baby ASA (initiated): taking  [x] Pregnancy dated by: 7 week ultrasound    [] Anatomy US: (19-20 wks)  [] Federal Sterilization consent signed (if indicated):  [] 1hr GCT at " 24-28wks:  [x] Rhogam (if indicated): Not indicated  [] Fetal Surveillance (if indicated):  [] Tdap (27-32 wks, may be given up to 36 wks if initial window missed):   [] RSV (32-36 wks) (Sept. to end of Jan):     [] Feeding Intentions:  [] Postpartum Birth control method:   [] GBS at 36 - 37 wks:  [] 39 weeks discussion of IOL vs. Expectant management:  [] Mode of delivery ( anticipated ):           Current Assessment & Plan   Ultrasound results from this morning pending - reports normal  Reports normal cfDNA but result is not loaded in out system              No orders of the defined types were placed in this encounter.       RTC in 4 weeks      Zee Galaviz MD

## 2025-07-07 NOTE — ASSESSMENT & PLAN NOTE
Ultrasound results from this morning pending - reports normal  Reports normal cfDNA but result is not loaded in out system

## 2025-07-08 ENCOUNTER — APPOINTMENT (OUTPATIENT)
Dept: ENDOCRINOLOGY | Facility: CLINIC | Age: 40
End: 2025-07-08
Payer: COMMERCIAL

## 2025-07-11 ENCOUNTER — APPOINTMENT (OUTPATIENT)
Dept: PRIMARY CARE | Facility: CLINIC | Age: 40
End: 2025-07-11
Payer: COMMERCIAL

## 2025-07-14 ENCOUNTER — TELEPHONE (OUTPATIENT)
Dept: ENDOCRINOLOGY | Facility: CLINIC | Age: 40
End: 2025-07-14
Payer: COMMERCIAL

## 2025-07-14 NOTE — TELEPHONE ENCOUNTER
Called the patient there was no answer left a message to call the office    ANNETTE BUCHANAN 07/14/25 8:38 AM

## 2025-07-16 ENCOUNTER — APPOINTMENT (OUTPATIENT)
Dept: ENDOCRINOLOGY | Facility: CLINIC | Age: 40
End: 2025-07-16
Payer: COMMERCIAL

## 2025-08-04 ENCOUNTER — APPOINTMENT (OUTPATIENT)
Dept: OBSTETRICS AND GYNECOLOGY | Facility: CLINIC | Age: 40
End: 2025-08-04
Payer: COMMERCIAL

## 2025-08-04 VITALS — WEIGHT: 162.4 LBS | DIASTOLIC BLOOD PRESSURE: 78 MMHG | SYSTOLIC BLOOD PRESSURE: 118 MMHG | BODY MASS INDEX: 24.69 KG/M2

## 2025-08-04 DIAGNOSIS — Z3A.17 17 WEEKS GESTATION OF PREGNANCY (HHS-HCC): Primary | ICD-10-CM

## 2025-08-04 DIAGNOSIS — O26.892 HEADACHE IN PREGNANCY, ANTEPARTUM, SECOND TRIMESTER (HHS-HCC): ICD-10-CM

## 2025-08-04 DIAGNOSIS — O21.9 NAUSEA AND VOMITING IN PREGNANCY PRIOR TO 22 WEEKS GESTATION: ICD-10-CM

## 2025-08-04 DIAGNOSIS — R51.9 HEADACHE IN PREGNANCY, ANTEPARTUM, SECOND TRIMESTER (HHS-HCC): ICD-10-CM

## 2025-08-04 PROCEDURE — 0501F PRENATAL FLOW SHEET: CPT | Performed by: OBSTETRICS & GYNECOLOGY

## 2025-08-04 ASSESSMENT — PATIENT HEALTH QUESTIONNAIRE - PHQ9
SUM OF ALL RESPONSES TO PHQ9 QUESTIONS 1 AND 2: 0
2. FEELING DOWN, DEPRESSED OR HOPELESS: NOT AT ALL
1. LITTLE INTEREST OR PLEASURE IN DOING THINGS: NOT AT ALL

## 2025-08-04 ASSESSMENT — PAIN SCALES - GENERAL: PAINLEVEL_OUTOF10: 0 - NO PAIN

## 2025-08-04 NOTE — PROGRESS NOTES
"Ob Follow-up  25     SUBJECTIVE    HPI: Rosalina Castillo \"Ledy" is a 40 y.o.  at 17w2d here for RPNV.  She has no contractions, bleeding, or LOF. Reports normal fetal movement.      Still getting headaches. Cyprohepatide works best. They are daily.     Nausea improved but still present. Occasional zofran and occasional doxylamine      OBJECTIVE  Visit Vitals  /78   Wt 73.7 kg (162 lb 6.4 oz)   LMP 2025 (Exact Date)   BMI 24.69 kg/m²   OB Status Pregnant   Smoking Status Never   BSA 1.88 m²            ASSESSMENT & PLAN    Rosalina Castillo \"Ledy" is a 40 y.o.  at 17w2d here for the following concerns we addressed today:    Problem List Items Addressed This Visit          Pregnancy    Nausea and vomiting in pregnancy prior to 22 weeks gestation    Overview   Improved but not resolved  Using doxylamine and zofran occasionally         Headache in pregnancy, antepartum, second trimester ([facility])    Overview   No relief with tylenol or mag oxide  Some relief with periactin but still with persistent daily headaches  Recommend neurology consultation         Relevant Orders    Referral to Neurology    17 weeks gestation of pregnancy ([facility]) - Primary    Overview   Desired provider in labor: [] CNM  [] Physician   [] Either Acceptable  [x] Blood Products: [x] Yes, accepts [] No, needs counseling  [x] Initial BMI: 23.27   [x] Prenatal Labs: A+, hgb 13.4, RI  [x] Cervical Cancer Screening up to date: NILM/neg HPV in 3/2023  [x] Rh status: positive  [] Screen for IPV and Substance Use Risk:  [x] Genetic Screening (cfDNA):  rr cfDNA  [x] First Trimester Anatomy Screen (11-13.6 wks): Normal  [x] Baby ASA (initiated): taking  [x] Pregnancy dated by: 7 week ultrasound    [] Anatomy US: (19-20 wks)  [] Federal Sterilization consent signed (if indicated):  [] 1hr GCT at 24-28wks:  [x] Rhogam (if indicated): Not indicated  [] Fetal Surveillance (if indicated):  [] Tdap (27-32 wks, may be given up to 36 wks " if initial window missed):   [] RSV (32-36 wks) (Sept. to end of Jan):     [] Feeding Intentions:  [] Postpartum Birth control method:   [] GBS at 36 - 37 wks:  [] 39 weeks discussion of IOL vs. Expectant management:  [] Mode of delivery ( anticipated ):           Current Assessment & Plan   Anatomy ultrasound scheduled.              Orders Placed This Encounter   Procedures    Referral to Neurology     Standing Status:   Future     Expected Date:   8/4/2025     Expiration Date:   8/4/2026     Referral Priority:   Routine     Referral Type:   Consultation     Referral Reason:   Specialty Services Required     Requested Specialty:   Neurology     Number of Visits Requested:   1        RTC in 4 weeks      Zee Galaviz MD

## 2025-08-19 ENCOUNTER — HOSPITAL ENCOUNTER (OUTPATIENT)
Dept: RADIOLOGY | Facility: CLINIC | Age: 40
Discharge: HOME | End: 2025-08-19
Payer: COMMERCIAL

## 2025-08-19 ENCOUNTER — APPOINTMENT (OUTPATIENT)
Dept: ENDOCRINOLOGY | Facility: CLINIC | Age: 40
End: 2025-08-19
Payer: COMMERCIAL

## 2025-08-19 DIAGNOSIS — Z87.59 HISTORY OF ECTOPIC PREGNANCY: ICD-10-CM

## 2025-08-19 PROCEDURE — 76811 OB US DETAILED SNGL FETUS: CPT

## 2025-08-19 PROCEDURE — 76811 OB US DETAILED SNGL FETUS: CPT | Performed by: MEDICAL GENETICS

## 2025-09-04 ENCOUNTER — OFFICE VISIT (OUTPATIENT)
Dept: NEUROLOGY | Facility: CLINIC | Age: 40
End: 2025-09-04
Payer: COMMERCIAL

## 2025-09-05 ENCOUNTER — APPOINTMENT (OUTPATIENT)
Age: 40
End: 2025-09-05
Payer: COMMERCIAL

## 2025-09-05 ENCOUNTER — ROUTINE PRENATAL (OUTPATIENT)
Dept: OBSTETRICS AND GYNECOLOGY | Facility: CLINIC | Age: 40
End: 2025-09-05
Payer: COMMERCIAL

## 2025-09-05 VITALS — BODY MASS INDEX: 25.39 KG/M2 | WEIGHT: 167 LBS | DIASTOLIC BLOOD PRESSURE: 60 MMHG | SYSTOLIC BLOOD PRESSURE: 112 MMHG

## 2025-09-05 DIAGNOSIS — Z3A.21 21 WEEKS GESTATION OF PREGNANCY (HHS-HCC): ICD-10-CM

## 2025-09-05 DIAGNOSIS — O09.522 MULTIGRAVIDA OF ADVANCED MATERNAL AGE IN SECOND TRIMESTER (HHS-HCC): ICD-10-CM

## 2025-09-05 DIAGNOSIS — Z33.1 PREGNANT STATE, INCIDENTAL (HHS-HCC): ICD-10-CM

## 2025-09-05 DIAGNOSIS — N63.14 BREAST LUMP ON RIGHT SIDE AT 4 O'CLOCK POSITION: Primary | ICD-10-CM

## 2025-09-05 PROCEDURE — 0501F PRENATAL FLOW SHEET: CPT | Performed by: ADVANCED PRACTICE MIDWIFE

## 2025-09-05 RX ORDER — ASPIRIN 81 MG/1
162 TABLET ORAL DAILY
COMMUNITY

## 2025-09-06 PROBLEM — O09.522 MULTIGRAVIDA OF ADVANCED MATERNAL AGE IN SECOND TRIMESTER (HHS-HCC): Status: ACTIVE | Noted: 2025-09-06

## 2025-09-06 PROBLEM — O00.102 LEFT TUBAL PREGNANCY WITHOUT INTRAUTERINE PREGNANCY (HHS-HCC): Status: RESOLVED | Noted: 2025-01-22 | Resolved: 2025-09-06

## 2025-09-06 PROBLEM — Z32.00 PREGNANCY EXAMINATION OR TEST, PREGNANCY UNCONFIRMED: Status: RESOLVED | Noted: 2025-01-22 | Resolved: 2025-09-06

## 2025-09-06 PROBLEM — G89.29 CHRONIC PELVIC PAIN IN FEMALE: Status: RESOLVED | Noted: 2025-05-13 | Resolved: 2025-09-06

## 2025-09-06 PROBLEM — R10.2 CHRONIC PELVIC PAIN IN FEMALE: Status: RESOLVED | Noted: 2025-05-13 | Resolved: 2025-09-06

## 2025-09-06 PROBLEM — N94.6 DYSMENORRHEA: Status: RESOLVED | Noted: 2025-05-13 | Resolved: 2025-09-06

## 2025-09-08 ENCOUNTER — APPOINTMENT (OUTPATIENT)
Dept: OBSTETRICS AND GYNECOLOGY | Facility: CLINIC | Age: 40
End: 2025-09-08
Payer: COMMERCIAL

## 2025-09-11 ENCOUNTER — APPOINTMENT (OUTPATIENT)
Dept: OBSTETRICS AND GYNECOLOGY | Facility: CLINIC | Age: 40
End: 2025-09-11
Payer: COMMERCIAL

## 2025-09-16 ENCOUNTER — APPOINTMENT (OUTPATIENT)
Dept: ENDOCRINOLOGY | Facility: CLINIC | Age: 40
End: 2025-09-16
Payer: COMMERCIAL

## 2025-10-06 ENCOUNTER — APPOINTMENT (OUTPATIENT)
Dept: OBSTETRICS AND GYNECOLOGY | Facility: CLINIC | Age: 40
End: 2025-10-06
Payer: COMMERCIAL

## 2025-11-03 ENCOUNTER — APPOINTMENT (OUTPATIENT)
Dept: OBSTETRICS AND GYNECOLOGY | Facility: CLINIC | Age: 40
End: 2025-11-03
Payer: COMMERCIAL

## 2025-11-05 ENCOUNTER — APPOINTMENT (OUTPATIENT)
Dept: NEUROLOGY | Facility: CLINIC | Age: 40
End: 2025-11-05
Payer: COMMERCIAL

## 2026-05-01 ENCOUNTER — APPOINTMENT (OUTPATIENT)
Dept: OBSTETRICS AND GYNECOLOGY | Facility: CLINIC | Age: 41
End: 2026-05-01
Payer: COMMERCIAL